# Patient Record
Sex: MALE | Race: WHITE | Employment: OTHER | URBAN - METROPOLITAN AREA
[De-identification: names, ages, dates, MRNs, and addresses within clinical notes are randomized per-mention and may not be internally consistent; named-entity substitution may affect disease eponyms.]

---

## 2018-10-19 PROBLEM — E53.8 B12 DEFICIENCY: Status: ACTIVE | Noted: 2018-10-19

## 2018-12-13 ENCOUNTER — HOSPITAL ENCOUNTER (OUTPATIENT)
Dept: MRI IMAGING | Age: 63
Discharge: HOME | End: 2018-12-13
Attending: OTOLARYNGOLOGY
Payer: MEDICARE

## 2018-12-13 DIAGNOSIS — R42 DIZZINESS: ICD-10-CM

## 2018-12-13 PROCEDURE — 70553 MRI BRAIN STEM W/O & W/DYE: CPT

## 2018-12-13 PROCEDURE — 74011250636 HC RX REV CODE- 250/636: Performed by: OTOLARYNGOLOGY

## 2018-12-13 PROCEDURE — A9575 INJ GADOTERATE MEGLUMI 0.1ML: HCPCS | Performed by: OTOLARYNGOLOGY

## 2018-12-13 RX ORDER — SODIUM CHLORIDE 0.9 % (FLUSH) 0.9 %
10 SYRINGE (ML) INJECTION
Status: COMPLETED | OUTPATIENT
Start: 2018-12-13 | End: 2018-12-13

## 2018-12-13 RX ORDER — GADOTERATE MEGLUMINE 376.9 MG/ML
20 INJECTION INTRAVENOUS
Status: COMPLETED | OUTPATIENT
Start: 2018-12-13 | End: 2018-12-13

## 2018-12-13 RX ADMIN — GADOTERATE MEGLUMINE 20 ML: 376.9 INJECTION INTRAVENOUS at 09:02

## 2018-12-13 RX ADMIN — Medication 10 ML: at 09:01

## 2018-12-20 PROBLEM — R06.02 SOB (SHORTNESS OF BREATH) ON EXERTION: Status: ACTIVE | Noted: 2018-12-20

## 2018-12-21 ENCOUNTER — APPOINTMENT (OUTPATIENT)
Dept: GENERAL RADIOLOGY | Age: 63
End: 2018-12-21
Attending: EMERGENCY MEDICINE
Payer: MEDICARE

## 2018-12-21 ENCOUNTER — APPOINTMENT (OUTPATIENT)
Dept: CT IMAGING | Age: 63
End: 2018-12-21
Attending: EMERGENCY MEDICINE
Payer: MEDICARE

## 2018-12-21 ENCOUNTER — HOSPITAL ENCOUNTER (OUTPATIENT)
Age: 63
Setting detail: OBSERVATION
Discharge: HOME | End: 2018-12-22
Attending: EMERGENCY MEDICINE | Admitting: FAMILY MEDICINE
Payer: MEDICARE

## 2018-12-21 DIAGNOSIS — I26.99 OTHER ACUTE PULMONARY EMBOLISM WITHOUT ACUTE COR PULMONALE (HCC): Primary | ICD-10-CM

## 2018-12-21 LAB
ALBUMIN SERPL-MCNC: 3.5 G/DL (ref 3.2–4.6)
ALBUMIN/GLOB SERPL: 1.1 {RATIO}
ALP SERPL-CCNC: 74 U/L (ref 50–136)
ALT SERPL-CCNC: 21 U/L (ref 12–65)
ANION GAP SERPL CALC-SCNC: 13 MMOL/L
APTT PPP: 68.6 SEC (ref 24.7–39.8)
AST SERPL-CCNC: 22 U/L (ref 15–37)
ATRIAL RATE: 112 BPM
BASOPHILS # BLD: 0 K/UL (ref 0–0.2)
BASOPHILS NFR BLD: 0 % (ref 0–2)
BILIRUB SERPL-MCNC: 0.8 MG/DL (ref 0.2–1.1)
BNP SERPL-MCNC: 53 PG/ML
BUN SERPL-MCNC: 19 MG/DL (ref 8–23)
CALCIUM SERPL-MCNC: 8.4 MG/DL (ref 8.3–10.4)
CALCULATED P AXIS, ECG09: 70 DEGREES
CALCULATED R AXIS, ECG10: 78 DEGREES
CALCULATED T AXIS, ECG11: 70 DEGREES
CHLORIDE SERPL-SCNC: 108 MMOL/L (ref 98–107)
CO2 SERPL-SCNC: 21 MMOL/L (ref 21–32)
CREAT SERPL-MCNC: 1.19 MG/DL (ref 0.8–1.5)
D DIMER PPP FEU-MCNC: 4.54 UG/ML(FEU)
DIAGNOSIS, 93000: NORMAL
DIFFERENTIAL METHOD BLD: ABNORMAL
EOSINOPHIL # BLD: 0 K/UL (ref 0–0.8)
EOSINOPHIL NFR BLD: 1 % (ref 0.5–7.8)
ERYTHROCYTE [DISTWIDTH] IN BLOOD BY AUTOMATED COUNT: 13.1 % (ref 11.9–14.6)
GLOBULIN SER CALC-MCNC: 3.3 G/DL (ref 2.3–3.5)
GLUCOSE SERPL-MCNC: 117 MG/DL (ref 65–100)
HCT VFR BLD AUTO: 43.1 % (ref 41.1–50.3)
HGB BLD-MCNC: 14.3 G/DL (ref 13.6–17.2)
IMM GRANULOCYTES # BLD: 0 K/UL (ref 0–0.5)
IMM GRANULOCYTES NFR BLD AUTO: 0 % (ref 0–5)
LYMPHOCYTES # BLD: 0.7 K/UL (ref 0.5–4.6)
LYMPHOCYTES NFR BLD: 8 % (ref 13–44)
MCH RBC QN AUTO: 35 PG (ref 26.1–32.9)
MCHC RBC AUTO-ENTMCNC: 33.2 G/DL (ref 31.4–35)
MCV RBC AUTO: 105.4 FL (ref 79.6–97.8)
MONOCYTES # BLD: 0.7 K/UL (ref 0.1–1.3)
MONOCYTES NFR BLD: 9 % (ref 4–12)
NEUTS SEG # BLD: 6.7 K/UL (ref 1.7–8.2)
NEUTS SEG NFR BLD: 82 % (ref 43–78)
NRBC # BLD: 0 K/UL (ref 0–0.2)
P-R INTERVAL, ECG05: 134 MS
PLATELET # BLD AUTO: 162 K/UL (ref 150–450)
PMV BLD AUTO: 10.2 FL (ref 9.4–12.3)
POTASSIUM SERPL-SCNC: 3.4 MMOL/L (ref 3.5–5.1)
PROT SERPL-MCNC: 6.8 G/DL
Q-T INTERVAL, ECG07: 342 MS
QRS DURATION, ECG06: 86 MS
QTC CALCULATION (BEZET), ECG08: 466 MS
RBC # BLD AUTO: 4.09 M/UL (ref 4.23–5.6)
SODIUM SERPL-SCNC: 142 MMOL/L (ref 136–145)
T4 FREE SERPL-MCNC: 1.1 NG/DL (ref 0.78–1.46)
TROPONIN I BLD-MCNC: 0.03 NG/ML (ref 0.02–0.05)
TROPONIN I BLD-MCNC: 0.04 NG/ML (ref 0.02–0.05)
TSH SERPL DL<=0.005 MIU/L-ACNC: 0.67 UIU/ML
VENTRICULAR RATE, ECG03: 112 BPM
WBC # BLD AUTO: 8.2 K/UL (ref 4.3–11.1)

## 2018-12-21 PROCEDURE — 99218 HC RM OBSERVATION: CPT

## 2018-12-21 PROCEDURE — 96375 TX/PRO/DX INJ NEW DRUG ADDON: CPT

## 2018-12-21 PROCEDURE — 36415 COLL VENOUS BLD VENIPUNCTURE: CPT

## 2018-12-21 PROCEDURE — 71045 X-RAY EXAM CHEST 1 VIEW: CPT

## 2018-12-21 PROCEDURE — 74011636320 HC RX REV CODE- 636/320: Performed by: EMERGENCY MEDICINE

## 2018-12-21 PROCEDURE — 74011000258 HC RX REV CODE- 258: Performed by: EMERGENCY MEDICINE

## 2018-12-21 PROCEDURE — 80053 COMPREHEN METABOLIC PANEL: CPT

## 2018-12-21 PROCEDURE — 99285 EMERGENCY DEPT VISIT HI MDM: CPT | Performed by: EMERGENCY MEDICINE

## 2018-12-21 PROCEDURE — 96376 TX/PRO/DX INJ SAME DRUG ADON: CPT

## 2018-12-21 PROCEDURE — 85730 THROMBOPLASTIN TIME PARTIAL: CPT

## 2018-12-21 PROCEDURE — 84484 ASSAY OF TROPONIN QUANT: CPT

## 2018-12-21 PROCEDURE — 85025 COMPLETE CBC W/AUTO DIFF WBC: CPT

## 2018-12-21 PROCEDURE — 85379 FIBRIN DEGRADATION QUANT: CPT

## 2018-12-21 PROCEDURE — 96374 THER/PROPH/DIAG INJ IV PUSH: CPT | Performed by: EMERGENCY MEDICINE

## 2018-12-21 PROCEDURE — 96366 THER/PROPH/DIAG IV INF ADDON: CPT

## 2018-12-21 PROCEDURE — 71260 CT THORAX DX C+: CPT

## 2018-12-21 PROCEDURE — 74011250636 HC RX REV CODE- 250/636: Performed by: EMERGENCY MEDICINE

## 2018-12-21 PROCEDURE — 74011250636 HC RX REV CODE- 250/636: Performed by: FAMILY MEDICINE

## 2018-12-21 PROCEDURE — 93005 ELECTROCARDIOGRAM TRACING: CPT | Performed by: EMERGENCY MEDICINE

## 2018-12-21 PROCEDURE — 84443 ASSAY THYROID STIM HORMONE: CPT

## 2018-12-21 PROCEDURE — 84439 ASSAY OF FREE THYROXINE: CPT

## 2018-12-21 PROCEDURE — 96365 THER/PROPH/DIAG IV INF INIT: CPT | Performed by: EMERGENCY MEDICINE

## 2018-12-21 PROCEDURE — 83880 ASSAY OF NATRIURETIC PEPTIDE: CPT

## 2018-12-21 RX ORDER — HYDROCODONE BITARTRATE AND ACETAMINOPHEN 5; 325 MG/1; MG/1
1 TABLET ORAL
Status: DISCONTINUED | OUTPATIENT
Start: 2018-12-21 | End: 2018-12-22 | Stop reason: HOSPADM

## 2018-12-21 RX ORDER — SODIUM CHLORIDE 9 MG/ML
1000 INJECTION, SOLUTION INTRAVENOUS ONCE
Status: COMPLETED | OUTPATIENT
Start: 2018-12-21 | End: 2018-12-21

## 2018-12-21 RX ORDER — PREDNISONE 5 MG/1
5 TABLET ORAL
Status: DISCONTINUED | OUTPATIENT
Start: 2018-12-22 | End: 2018-12-22 | Stop reason: HOSPADM

## 2018-12-21 RX ORDER — LISINOPRIL 20 MG/1
40 TABLET ORAL DAILY
Status: DISCONTINUED | OUTPATIENT
Start: 2018-12-22 | End: 2018-12-22 | Stop reason: HOSPADM

## 2018-12-21 RX ORDER — LORATADINE 10 MG/1
10 TABLET ORAL DAILY
Status: DISCONTINUED | OUTPATIENT
Start: 2018-12-22 | End: 2018-12-22 | Stop reason: HOSPADM

## 2018-12-21 RX ORDER — FLUTICASONE PROPIONATE 50 MCG
2 SPRAY, SUSPENSION (ML) NASAL DAILY
Status: DISCONTINUED | OUTPATIENT
Start: 2018-12-22 | End: 2018-12-22 | Stop reason: HOSPADM

## 2018-12-21 RX ORDER — SODIUM CHLORIDE 0.9 % (FLUSH) 0.9 %
5-10 SYRINGE (ML) INJECTION AS NEEDED
Status: DISCONTINUED | OUTPATIENT
Start: 2018-12-21 | End: 2018-12-22 | Stop reason: HOSPADM

## 2018-12-21 RX ORDER — LEVOTHYROXINE SODIUM 100 UG/1
200 TABLET ORAL
Status: DISCONTINUED | OUTPATIENT
Start: 2018-12-22 | End: 2018-12-22 | Stop reason: HOSPADM

## 2018-12-21 RX ORDER — SODIUM CHLORIDE 0.9 % (FLUSH) 0.9 %
5-10 SYRINGE (ML) INJECTION EVERY 8 HOURS
Status: DISCONTINUED | OUTPATIENT
Start: 2018-12-21 | End: 2018-12-22 | Stop reason: HOSPADM

## 2018-12-21 RX ORDER — ACETAMINOPHEN 325 MG/1
650 TABLET ORAL
Status: DISCONTINUED | OUTPATIENT
Start: 2018-12-21 | End: 2018-12-22 | Stop reason: HOSPADM

## 2018-12-21 RX ORDER — MORPHINE SULFATE 2 MG/ML
1 INJECTION, SOLUTION INTRAMUSCULAR; INTRAVENOUS
Status: DISCONTINUED | OUTPATIENT
Start: 2018-12-21 | End: 2018-12-22 | Stop reason: HOSPADM

## 2018-12-21 RX ORDER — SODIUM CHLORIDE 0.9 % (FLUSH) 0.9 %
10 SYRINGE (ML) INJECTION
Status: COMPLETED | OUTPATIENT
Start: 2018-12-21 | End: 2018-12-21

## 2018-12-21 RX ORDER — HEPARIN SODIUM 5000 [USP'U]/ML
5000 INJECTION, SOLUTION INTRAVENOUS; SUBCUTANEOUS
Status: COMPLETED | OUTPATIENT
Start: 2018-12-21 | End: 2018-12-21

## 2018-12-21 RX ORDER — HEPARIN SODIUM 5000 [USP'U]/ML
35 INJECTION, SOLUTION INTRAVENOUS; SUBCUTANEOUS ONCE
Status: COMPLETED | OUTPATIENT
Start: 2018-12-22 | End: 2018-12-21

## 2018-12-21 RX ORDER — HEPARIN SODIUM 5000 [USP'U]/100ML
18-36 INJECTION, SOLUTION INTRAVENOUS
Status: DISCONTINUED | OUTPATIENT
Start: 2018-12-21 | End: 2018-12-22

## 2018-12-21 RX ORDER — ONDANSETRON 2 MG/ML
4 INJECTION INTRAMUSCULAR; INTRAVENOUS
Status: DISCONTINUED | OUTPATIENT
Start: 2018-12-21 | End: 2018-12-22 | Stop reason: HOSPADM

## 2018-12-21 RX ADMIN — Medication 10 ML: at 15:42

## 2018-12-21 RX ADMIN — HEPARIN SODIUM 3150 UNITS: 5000 INJECTION INTRAVENOUS; SUBCUTANEOUS at 23:44

## 2018-12-21 RX ADMIN — Medication 5 ML: at 21:40

## 2018-12-21 RX ADMIN — HEPARIN SODIUM AND DEXTROSE 18 UNITS/KG/HR: 5000; 5 INJECTION INTRAVENOUS at 17:06

## 2018-12-21 RX ADMIN — HEPARIN SODIUM 5000 UNITS: 5000 INJECTION INTRAVENOUS; SUBCUTANEOUS at 17:04

## 2018-12-21 RX ADMIN — SODIUM CHLORIDE 100 ML: 900 INJECTION, SOLUTION INTRAVENOUS at 15:42

## 2018-12-21 RX ADMIN — IOPAMIDOL 100 ML: 755 INJECTION, SOLUTION INTRAVENOUS at 15:42

## 2018-12-21 RX ADMIN — SODIUM CHLORIDE 1000 ML: 900 INJECTION, SOLUTION INTRAVENOUS at 13:30

## 2018-12-21 RX ADMIN — HEPARIN SODIUM AND DEXTROSE 18 UNITS/KG/HR: 5000; 5 INJECTION INTRAVENOUS at 18:04

## 2018-12-21 RX ADMIN — HEPARIN SODIUM AND DEXTROSE 20 UNITS/KG/HR: 5000; 5 INJECTION INTRAVENOUS at 23:45

## 2018-12-21 NOTE — H&P
HOSPITALIST H&P/CONSULT  NAME:  Lorenzo Lima   Age:  70 y.o.  :   1955   MRN:   [id number]  PCP: Sly Haque MD  Consulting MD:  Treatment Team: Attending Provider: Moreno Gilmore MD  HPI:   Patient is a 57TYR with PMH significant for HTN, hypothyroidism, GERD who presents to the ER with progressive SOB and chest pain. Patient reports that starting last Saturday, he began feeling SOB. He reports not feeling \"that bad\" but did make an appt with his PCP on Thursday. He saw his doctor yesterday who told him that it could be his heart or his lungs, but his VS were stable. She told him to go to the ER if his symptoms progressed or if he developed chest pain. This morning, he was watching TV and had the onset of severe mid-chest pain. Described as feeling like someone was \"grabbing his heart. \"  He came to the ER for further evaluation. D-dimer was elevated and CTA shows diffuse and substantial B PEs. Pt was started on heparin drip, and Hospitalist Service was consulted for admission. Patient denies any recent travel via car, plane, bus, train. He states he is retired, so he is pretty sedentary, but he is not bed-bound or immobile for long periods of time. He denies any personal or family history of coagulopathy. He does not smoke. He does report that he sleeps in his recliner because of his GERD. He only complains of SOB, chest pain, and intermittent headache. Not currently hypoxic. Denies any calf pain or swelling. Heparin bolus and infusion already initiated in ER by time of my bedside evaluation.     Complete ROS done and is as stated in HPI or otherwise negative  Past Medical History:   Diagnosis Date    Arthritis     Change in vision     Chronic hip pain 10/20/2016    Edema leg     Endocrine disease     hypothyroidism    GERD (gastroesophageal reflux disease)     well controlled on meds    High cholesterol     Hypertension     Iron deficiency anemia     Murmur  Muscle weakness     Myalgia     Neurological disorder     HA    Pelvic pain in male     Polymyalgia rheumatica ([facility] [address] 10/20/2016    Psychiatric disorder     depression    SDH (subdural hematoma) (HCC)     HA, memory loss    Thyroid disease     hypo. Past Surgical History:   Procedure Laterality Date    HX TONSILLECTOMY        Prior to Admission Medications   Prescriptions Last Dose Informant Patient Reported? Taking?   fexofenadine (ALLEGRA) 180 mg tablet   No Yes   Sig: Take 1 Tab by mouth daily. fluticasone (FLONASE) 50 mcg/actuation nasal spray   No Yes   Si Sprays by Both Nostrils route daily. levothyroxine (SYNTHROID) 200 mcg tablet   No Yes   Sig: Take 1 Tab by mouth Daily (before breakfast). lisinopril (PRINIVIL, ZESTRIL) 40 mg tablet   No Yes   Sig: Take 1 Tab by mouth daily. predniSONE (DELTASONE) 5 mg tablet   No Yes   Sig: Take 1 Tab by mouth daily (with breakfast). Facility-Administered Medications: None     Allergies   Allergen Reactions    Pcn [Penicillins] Unknown (comments)    Amoxicillin Hives      Social History     Tobacco Use    Smoking status: Former Smoker     Years: 20.00     Last attempt to quit: 2006     Years since quittin.8    Smokeless tobacco: Former User    Tobacco comment: Chew, quit 15 years ago. Substance Use Topics    Alcohol use:  Yes     Alcohol/week: 7.0 oz     Types: 14 Shots of liquor per week      Family History   Problem Relation Age of Onset    Cancer Mother     Heart Disease Father     Hypertension Father       Objective:     Visit Vitals  BP (!) 163/100   Pulse 91   Temp 97.5 °F (36.4 °C)   Resp 17   Ht 6' 5\" (1.956 m)   Wt 89.4 kg (197 lb)   SpO2 98%   BMI 23.36 kg/m²      Temp (24hrs), Av.5 °F (36.4 °C), Min:97.5 °F (36.4 °C), Max:97.5 °F (36.4 °C)    Oxygen Therapy  O2 Sat (%): 98 % (18 1730)  Pulse via Oximetry: 85 beats per minute (18 1730)  O2 Device: Room air (18 1354)  Physical Exam:  General:    Alert, cooperative, no distress, appears stated age. Head:   Normocephalic, without obvious abnormality, atraumatic. Nose:  Nares normal. No drainage or sinus tenderness. Lungs:   Diminished, but clear to auscultation bilaterally. No Wheezing or Rhonchi. No rales. Heart:   Regular rate and rhythm,  no murmur, rub or gallop. Abdomen:   Soft, non-tender. Not distended. Bowel sounds normal.   Extremities: No cyanosis. No edema. No clubbing  Skin:     Texture, turgor normal. No rashes or lesions. Not Jaundiced  Neurologic: Alert and oriented x 3, no focal deficits   Data Review:   Recent Results (from the past 24 hour(s))   EKG, 12 LEAD, INITIAL    Collection Time: 12/21/18 12:37 PM   Result Value Ref Range    Ventricular Rate 112 BPM    Atrial Rate 112 BPM    P-R Interval 134 ms    QRS Duration 86 ms    Q-T Interval 342 ms    QTC Calculation (Bezet) 466 ms    Calculated P Axis 70 degrees    Calculated R Axis 78 degrees    Calculated T Axis 70 degrees    Diagnosis       Sinus tachycardia  Possible Left atrial enlargement  Nonspecific ST abnormality  Abnormal ECG  No previous ECGs available  Confirmed by TIFFANIE HECTOR (), Ivette Vargas ([id number]) on 12/21/2018 4:17:23 PM     CBC WITH AUTOMATED DIFF    Collection Time: 12/21/18 12:53 PM   Result Value Ref Range    WBC 8.2 4.3 - 11.1 K/uL    RBC 4.09 (L) 4.23 - 5.6 M/uL    HGB 14.3 13.6 - 17.2 g/dL    HCT 43.1 41.1 - 50.3 %    .4 (H) 79.6 - 97.8 FL    MCH 35.0 (H) 26.1 - 32.9 PG    MCHC 33.2 31.4 - 35.0 g/dL    RDW 13.1 11.9 - 14.6 %    PLATELET 582 854 - 595 K/uL    MPV 10.2 9.4 - 12.3 FL    ABSOLUTE NRBC 0.00 0.0 - 0.2 K/uL    DF AUTOMATED      NEUTROPHILS 82 (H) 43 - 78 %    LYMPHOCYTES 8 (L) 13 - 44 %    MONOCYTES 9 4.0 - 12.0 %    EOSINOPHILS 1 0.5 - 7.8 %    BASOPHILS 0 0.0 - 2.0 %    IMMATURE GRANULOCYTES 0 0.0 - 5.0 %    ABS. NEUTROPHILS 6.7 1.7 - 8.2 K/UL    ABS. LYMPHOCYTES 0.7 0.5 - 4.6 K/UL    ABS. MONOCYTES 0.7 0.1 - 1.3 K/UL    ABS. EOSINOPHILS 0.0 0.0 - 0.8 K/UL    ABS. BASOPHILS 0.0 0.0 - 0.2 K/UL    ABS. IMM. GRANS. 0.0 0.0 - 0.5 K/UL   METABOLIC PANEL, COMPREHENSIVE    Collection Time: 12/21/18 12:53 PM   Result Value Ref Range    Sodium 142 136 - 145 mmol/L    Potassium 3.4 (L) 3.5 - 5.1 mmol/L    Chloride 108 (H) 98 - 107 mmol/L    CO2 21 21 - 32 mmol/L    Anion gap 13 mmol/L    Glucose 117 (H) 65 - 100 mg/dL    BUN 19 8 - 23 MG/DL    Creatinine 1.19 0.8 - 1.5 MG/DL    GFR est AA >60 >60 ml/min/1.73m2    GFR est non-AA >60 ml/min/1.73m2    Calcium 8.4 8.3 - 10.4 MG/DL    Bilirubin, total 0.8 0.2 - 1.1 MG/DL    ALT (SGPT) 21 12 - 65 U/L    AST (SGOT) 22 15 - 37 U/L    Alk. phosphatase 74 50 - 136 U/L    Protein, total 6.8 g/dL    Albumin 3.5 3.2 - 4.6 g/dL    Globulin 3.3 2.3 - 3.5 g/dL    A-G Ratio 1.1     TSH 3RD GENERATION    Collection Time: 12/21/18 12:53 PM   Result Value Ref Range    TSH 0.667 uIU/mL   T4, FREE    Collection Time: 12/21/18 12:53 PM   Result Value Ref Range    T4, Free 1.1 0.78 - 1.46 NG/DL   D DIMER    Collection Time: 12/21/18 12:53 PM   Result Value Ref Range    D DIMER 4.54 (HH) <0.56 ug/ml(FEU)   BNP    Collection Time: 12/21/18 12:53 PM   Result Value Ref Range    BNP 53 pg/mL   POC TROPONIN-I    Collection Time: 12/21/18 12:56 PM   Result Value Ref Range    Troponin-I (POC) 0.04 0.02 - 0.05 ng/ml   POC TROPONIN-I    Collection Time: 12/21/18  4:12 PM   Result Value Ref Range    Troponin-I (POC) 0.03 0.02 - 0.05 ng/ml     Imaging /Procedures /Studies     Assessment and Plan:      Active Hospital Problems    Diagnosis Date Noted    Pulmonary emboli ([facility] [address] 12/21/2018    Hypertension 10/20/2016    Hypothyroidism 02/10/2011       PLAN  Extensive Bilateral PEs  - No obvious inciting factor  - Not currently hypoxic  - Will continue heparin drip as started in ER  - Consult with Hematology for their recommendation  - Will check hypercoagulability labs, except lupus anticoagulant and antithrombin as these can be affected by heparin, which has already been initiated  - Pain control  - O2 if needed    HTN  - Restart Lisinopril    Hypothyroidism  - Restart Synthroid       Code Status: full    Anticipated discharge: 1-3 days, pending clinical course      Signed By: Yazmin España MD     December 21, 2018

## 2018-12-21 NOTE — ED NOTES
TRANSFER - OUT REPORT:    Verbal report given to Jasbir Mills (name) on Lorre Heimlich  being transferred to 351 (unit) for routine progression of care       Report consisted of patients Situation, Background, Assessment and   Recommendations(SBAR). Information from the following report(s) ED Summary was reviewed with the receiving nurse. Lines:   Peripheral IV 12/21/18 Left Antecubital (Active)   Site Assessment Clean, dry, & intact 12/21/2018  1:53 PM   Phlebitis Assessment 0 12/21/2018  1:53 PM   Infiltration Assessment 0 12/21/2018  1:53 PM   Dressing Status Clean, dry, & intact 12/21/2018  1:53 PM   Alcohol Cap Used No 12/21/2018  1:53 PM        Opportunity for questions and clarification was provided.       Patient transported with:   Registered Nurse

## 2018-12-21 NOTE — ED TRIAGE NOTES
Pt in states sob x 1 week. States seen by pcp yesterday and told to come to ed if he began having chest pain. Pt states cp started today while watching tv. Denies n/v/d/diaphoresis. States sob worse with exertion. Denies cough.

## 2018-12-22 ENCOUNTER — APPOINTMENT (OUTPATIENT)
Dept: ULTRASOUND IMAGING | Age: 63
End: 2018-12-22
Attending: FAMILY MEDICINE
Payer: MEDICARE

## 2018-12-22 VITALS
TEMPERATURE: 98.1 F | WEIGHT: 197 LBS | OXYGEN SATURATION: 97 % | HEART RATE: 89 BPM | DIASTOLIC BLOOD PRESSURE: 106 MMHG | HEIGHT: 77 IN | SYSTOLIC BLOOD PRESSURE: 170 MMHG | BODY MASS INDEX: 23.26 KG/M2 | RESPIRATION RATE: 19 BRPM

## 2018-12-22 LAB
ANION GAP SERPL CALC-SCNC: 11 MMOL/L
APTT PPP: 105.2 SEC (ref 24.7–39.8)
APTT PPP: 61.4 SEC (ref 24.7–39.8)
BASOPHILS # BLD: 0 K/UL (ref 0–0.2)
BASOPHILS NFR BLD: 1 % (ref 0–2)
BUN SERPL-MCNC: 13 MG/DL (ref 8–23)
CALCIUM SERPL-MCNC: 8.3 MG/DL (ref 8.3–10.4)
CHLORIDE SERPL-SCNC: 109 MMOL/L (ref 98–107)
CO2 SERPL-SCNC: 24 MMOL/L (ref 21–32)
CREAT SERPL-MCNC: 0.92 MG/DL (ref 0.8–1.5)
DIFFERENTIAL METHOD BLD: ABNORMAL
EOSINOPHIL # BLD: 0.1 K/UL (ref 0–0.8)
EOSINOPHIL NFR BLD: 2 % (ref 0.5–7.8)
ERYTHROCYTE [DISTWIDTH] IN BLOOD BY AUTOMATED COUNT: 12.9 % (ref 11.9–14.6)
GLUCOSE SERPL-MCNC: 112 MG/DL (ref 65–100)
HCT VFR BLD AUTO: 37.3 % (ref 41.1–50.3)
HGB BLD-MCNC: 12.5 G/DL (ref 13.6–17.2)
IMM GRANULOCYTES # BLD: 0 K/UL (ref 0–0.5)
IMM GRANULOCYTES NFR BLD AUTO: 1 % (ref 0–5)
LYMPHOCYTES # BLD: 0.8 K/UL (ref 0.5–4.6)
LYMPHOCYTES NFR BLD: 14 % (ref 13–44)
MCH RBC QN AUTO: 35.2 PG (ref 26.1–32.9)
MCHC RBC AUTO-ENTMCNC: 33.5 G/DL (ref 31.4–35)
MCV RBC AUTO: 105.1 FL (ref 79.6–97.8)
MONOCYTES # BLD: 0.6 K/UL (ref 0.1–1.3)
MONOCYTES NFR BLD: 11 % (ref 4–12)
NEUTS SEG # BLD: 4.1 K/UL (ref 1.7–8.2)
NEUTS SEG NFR BLD: 73 % (ref 43–78)
NRBC # BLD: 0 K/UL (ref 0–0.2)
PLATELET # BLD AUTO: 149 K/UL (ref 150–450)
PMV BLD AUTO: 10.6 FL (ref 9.4–12.3)
POTASSIUM SERPL-SCNC: 3.6 MMOL/L (ref 3.5–5.1)
RBC # BLD AUTO: 3.55 M/UL (ref 4.23–5.6)
SODIUM SERPL-SCNC: 144 MMOL/L (ref 136–145)
WBC # BLD AUTO: 5.6 K/UL (ref 4.3–11.1)

## 2018-12-22 PROCEDURE — A9270 NON-COVERED ITEM OR SERVICE: HCPCS | Performed by: FAMILY MEDICINE

## 2018-12-22 PROCEDURE — 80048 BASIC METABOLIC PNL TOTAL CA: CPT

## 2018-12-22 PROCEDURE — 85306 CLOT INHIBIT PROT S FREE: CPT

## 2018-12-22 PROCEDURE — 96366 THER/PROPH/DIAG IV INF ADDON: CPT

## 2018-12-22 PROCEDURE — 85240 CLOT FACTOR VIII AHG 1 STAGE: CPT

## 2018-12-22 PROCEDURE — 74011636637 HC RX REV CODE- 636/637: Performed by: FAMILY MEDICINE

## 2018-12-22 PROCEDURE — 74011250637 HC RX REV CODE- 250/637: Performed by: FAMILY MEDICINE

## 2018-12-22 PROCEDURE — 85730 THROMBOPLASTIN TIME PARTIAL: CPT

## 2018-12-22 PROCEDURE — 99218 HC RM OBSERVATION: CPT

## 2018-12-22 PROCEDURE — 85025 COMPLETE CBC W/AUTO DIFF WBC: CPT

## 2018-12-22 PROCEDURE — 93970 EXTREMITY STUDY: CPT

## 2018-12-22 PROCEDURE — 85302 CLOT INHIBIT PROT C ANTIGEN: CPT

## 2018-12-22 PROCEDURE — 36415 COLL VENOUS BLD VENIPUNCTURE: CPT

## 2018-12-22 PROCEDURE — 74011250636 HC RX REV CODE- 250/636: Performed by: EMERGENCY MEDICINE

## 2018-12-22 RX ADMIN — LISINOPRIL 40 MG: 20 TABLET ORAL at 08:46

## 2018-12-22 RX ADMIN — PREDNISONE 5 MG: 5 TABLET ORAL at 08:46

## 2018-12-22 RX ADMIN — Medication 5 ML: at 05:37

## 2018-12-22 RX ADMIN — LEVOTHYROXINE SODIUM 200 MCG: 100 TABLET ORAL at 08:47

## 2018-12-22 RX ADMIN — Medication 10 ML: at 13:55

## 2018-12-22 RX ADMIN — LORATADINE 10 MG: 10 TABLET ORAL at 08:46

## 2018-12-22 RX ADMIN — HEPARIN SODIUM AND DEXTROSE 20 UNITS/KG/HR: 5000; 5 INJECTION INTRAVENOUS at 07:10

## 2018-12-22 NOTE — PROGRESS NOTES
Admission assessment complete. A&O x 4. No complaints at this time. Oriented to room and call light. No distress noted. Respirations even and unlabored. IV heparin drip infusing without difficulty. No complaints at this time. Call light within reach.

## 2018-12-22 NOTE — PROGRESS NOTES
Notified MD of PTt results. MD stated that patient is being discharged and not necessary to do a rate change.

## 2018-12-22 NOTE — PROGRESS NOTES
SW met with patient who is independent at home and presenting with no additional needs. Patient anticipated to discharge with Xarelto. SW provided patient with a $10 copay card.      Morris Iniguez, [address] [facility]    [address] [facility]  [email or web address]

## 2018-12-22 NOTE — DISCHARGE INSTRUCTIONS
DISCHARGE SUMMARY from Nurse    PATIENT INSTRUCTIONS:    After general anesthesia or intravenous sedation, for 24 hours or while taking prescription Narcotics:  · Limit your activities  · Do not drive and operate hazardous machinery  · Do not make important personal or business decisions  · Do  not drink alcoholic beverages  · If you have not urinated within 8 hours after discharge, please contact your surgeon on call. Report the following to your surgeon:  · Excessive pain, swelling, redness or odor of or around the surgical area  · Temperature over 100.5  · Nausea and vomiting lasting longer than 4 hours or if unable to take medications  · Any signs of decreased circulation or nerve impairment to extremity: change in color, persistent  numbness, tingling, coldness or increase pain  · Any questions    What to do at Home:    Recommended activity: Activity as tolerated, DO NOT SLEEP IN A RECLINER OR CHAIR, move around every hour    If you experience any of the above stated signs or symptoms, please follow up with MD.    *  Please give a list of your current medications to your Primary Care Provider. *  Please update this list whenever your medications are discontinued, doses are      changed, or new medications (including over-the-counter products) are added. *  Please carry medication information at all times in case of emergency situations. These are general instructions for a healthy lifestyle:    No smoking/ No tobacco products/ Avoid exposure to second hand smoke  Surgeon General's Warning:  Quitting smoking now greatly reduces serious risk to your health.     Obesity, smoking, and sedentary lifestyle greatly increases your risk for illness    A healthy diet, regular physical exercise & weight monitoring are important for maintaining a healthy lifestyle    You may be retaining fluid if you have a history of heart failure or if you experience any of the following symptoms:  Weight gain of 3 pounds or more overnight or 5 pounds in a week, increased swelling in our hands or feet or shortness of breath while lying flat in bed. Please call your doctor as soon as you notice any of these symptoms; do not wait until your next office visit. Recognize signs and symptoms of STROKE:    F-face looks uneven    A-arms unable to move or move unevenly    S-speech slurred or non-existent    T-time-call 911 as soon as signs and symptoms begin-DO NOT go       Back to bed or wait to see if you get better-TIME IS BRAIN. Warning Signs of HEART ATTACK     Call 911 if you have these symptoms:   Chest discomfort. Most heart attacks involve discomfort in the center of the chest that lasts more than a few minutes, or that goes away and comes back. It can feel like uncomfortable pressure, squeezing, fullness, or pain.  Discomfort in other areas of the upper body. Symptoms can include pain or discomfort in one or both arms, the back, neck, jaw, or stomach.  Shortness of breath with or without chest discomfort.  Other signs may include breaking out in a cold sweat, nausea, or lightheadedness. Don't wait more than five minutes to call 911 - MINUTES MATTER! Fast action can save your life. Calling 911 is almost always the fastest way to get lifesaving treatment. Emergency Medical Services staff can begin treatment when they arrive -- up to an hour sooner than if someone gets to the hospital by car. The discharge information has been reviewed with the patient. The patient verbalized understanding. Discharge medications reviewed with the patient and appropriate educational materials and side effects teaching were provided.   ___________________________________________________________________________________________________________________________________

## 2018-12-22 NOTE — PROGRESS NOTES
Pt admitted to [facility] 3rd floor. Admission database completed. Admission booklet given and reviewed with patient. Pt oriented to room and bed controls. Pt instructed to use call light for any needs, pt voiced understanding.

## 2018-12-22 NOTE — PROGRESS NOTES
12/21/18 1940   Dual Skin Pressure Injury Assessment   Dual Skin Pressure Injury Assessment WDL   Second Care Provider (Based on Facility Policy) Praneeth Pandya RN   Skin Integumentary   Skin Integumentary (WDL) WDL

## 2018-12-23 NOTE — DISCHARGE SUMMARY
Hospitalist Discharge Summary     Patient ID:  Jeremy Rivera  [id number]  70 y.o.  1955  Admit date: 12/21/2018 12:35 PM  Discharge date and time: 12/22/2018  Attending: Maurisio Camacho MD  PCP:  Estrellita Ireland MD  Treatment Team: Attending Provider: Maurisio Camacho MD; Care Manager: Otto Gascabody    Principal Diagnosis Pulmonary emboli [facility])   Principal Problem:    Pulmonary emboli ([address] (12/21/2018)    Active Problems:    Hypothyroidism (2/10/2011)      Hypertension (10/20/2016)             Hospital Course:  Please refer to the admission H&P for details of presentation. In summary, the patient is a 76yoM with h/o HTN and hypothyroidism who presented to the ER with chest pain and progress SOB. Patient was diagnosed with extensive B pulmonary emboli. He did not require supplemental oxygen. Chest pain resolved. He was started on heparin drip in the ER which was continued through his stay. Send out hypercoagulability labs were sent off, and PCP will follow up with these on discharge. Patient feels well and wishes to go home. I discussed risks and benefits of coumadin vs NOAC, including but not limited to, increased risk of bleeding, availability of reversal agents, monitoring, length of treatment. Patient opts for a NOAC and Xarelto has been prescribed. While pt did not have symptoms of LE clot, he was found to have LE DVT found on doppler. It is believed that as the patient sleeps in his recliner and reports some \"pinching\" of his leg, he may have developed a clot 2/2 positioning in sleep. Concerning symptoms including CP and worsening SOB and evidence of bleeding were discussed and patient voiced understanding that he needs to go to the nearest ER for reevaluation if these occure.     Significant Diagnostic Studies:       Labs: Results:       Chemistry Recent Labs     12/22/18  0617 12/21/18  1253   * 117*    142   K 3.6 3.4*   * 108*   CO2 24 21   BUN 13 19   CREA 0. 92 1.19   CA 8.3 8.4   AGAP 11 13   AP  --  74   TP  --  6.8   ALB  --  3.5   GLOB  --  3.3   AGRAT  --  1.1      CBC w/Diff Recent Labs     12/22/18  0617 12/21/18  1253   WBC 5.6 8.2   RBC 3.55* 4.09*   HGB 12.5* 14.3   HCT 37.3* 43.1   * 162   GRANS 73 82*   LYMPH 14 8*   EOS 2 1      Cardiac Enzymes No results for input(s): CPK, CKND1, MARIO in the last 72 hours. No lab exists for component: CKRMB, TROIP   Coagulation Recent Labs     12/22/18  1320 12/22/18 0617   APTT 61.4* 105.2*       Lipid Panel Lab Results   Component Value Date/Time    Cholesterol, total 183 10/18/2017 09:01 AM    HDL Cholesterol 3.21 10/18/2017 09:01 AM    LDL, calculated 100.4 10/18/2017 09:01 AM    VLDL 25 10/18/2017 09:01 AM    Triglyceride 128 10/18/2017 09:01 AM      BNP No results for input(s): BNPP in the last 72 hours. Liver Enzymes Recent Labs     12/21/18  1253   TP 6.8   ALB 3.5   AP 74   SGOT 22      Thyroid Studies Lab Results   Component Value Date/Time    TSH 0.667 12/21/2018 12:53 PM            Discharge Exam:  Visit Vitals  BP (!) 170/106 (BP 1 Location: Right arm, BP Patient Position: At rest) Comment: RN notified    Pulse 89   Temp 98.1 °F (36.7 °C)   Resp 19   Ht 6' 5\" (1.956 m)   Wt 89.4 kg (197 lb)   SpO2 97%   BMI 23.36 kg/m²     General appearance: alert, cooperative, no distress, appears stated age  Lungs: clear to auscultation bilaterally  Heart: regular rate and rhythm, S1, S2 normal, no murmur, click, rub or gallop  Abdomen: soft, non-tender. Bowel sounds normal. No masses,  no organomegaly  Extremities: no cyanosis or edema  Neurologic: Grossly normal    Disposition: home  Discharge Condition: stable  Patient Instructions:   Current Discharge Medication List      START taking these medications    Details   rivaroxaban (XARELTO) 15 mg (42)- 20 mg (9) DsPk Take one 15 mg tablet twice a day with food for the first 21 days. Then, take one 20 mg tablet once a day with food for 9 days.   Qty: 1 Dose Pack, Refills: 0         CONTINUE these medications which have NOT CHANGED    Details   levothyroxine (SYNTHROID) 200 mcg tablet Take 1 Tab by mouth Daily (before breakfast). Qty: 90 Tab, Refills: 1    Associated Diagnoses: Acquired hypothyroidism      predniSONE (DELTASONE) 5 mg tablet Take 1 Tab by mouth daily (with breakfast). Qty: 30 Tab, Refills: 3    Associated Diagnoses: Polymyalgia rheumatica (HCC)      lisinopril (PRINIVIL, ZESTRIL) 40 mg tablet Take 1 Tab by mouth daily. Qty: 90 Tab, Refills: 1    Associated Diagnoses: Essential hypertension      fexofenadine (ALLEGRA) 180 mg tablet Take 1 Tab by mouth daily. Qty: 30 Tab, Refills: 12    Associated Diagnoses: ETD (Eustachian tube dysfunction), bilateral      fluticasone (FLONASE) 50 mcg/actuation nasal spray 2 Sprays by Both Nostrils route daily.   Qty: 1 Bottle, Refills: 12    Associated Diagnoses: ETD (Eustachian tube dysfunction), bilateral             Activity: Activity as tolerated  Diet: Regular Diet  Wound Care: None needed    Follow-up  · PCP next week as scheduled    Time spent to discharge patient 35 minutes  Signed:  Dejuan Kelly MD  12/22/2018  7:13 PM

## 2019-01-01 LAB
FACT VIII ACT/NOR PPP: NORMAL %
FACTOR V LEIDEN MUTATION, XMF5LT: NORMAL %
PROT C AG ACT/NOR PPP IA: NORMAL %
PROT S FREE AG ACT/NOR PPP IA: NORMAL %
PROTHROMBIN G2021A MUTATION, XMPTMT: NORMAL

## 2019-02-15 ENCOUNTER — HOSPITAL ENCOUNTER (OUTPATIENT)
Dept: GENERAL RADIOLOGY | Age: 64
Discharge: HOME | End: 2019-02-15
Payer: MEDICARE

## 2019-02-15 DIAGNOSIS — R06.02 SOB (SHORTNESS OF BREATH) ON EXERTION: ICD-10-CM

## 2019-02-15 PROCEDURE — 71046 X-RAY EXAM CHEST 2 VIEWS: CPT

## 2019-02-25 ENCOUNTER — APPOINTMENT (OUTPATIENT)
Dept: GENERAL RADIOLOGY | Age: 64
End: 2019-02-25
Attending: EMERGENCY MEDICINE
Payer: MEDICARE

## 2019-02-25 ENCOUNTER — HOSPITAL ENCOUNTER (EMERGENCY)
Age: 64
Discharge: HOME | End: 2019-02-25
Attending: EMERGENCY MEDICINE
Payer: MEDICARE

## 2019-02-25 VITALS
HEART RATE: 105 BPM | BODY MASS INDEX: 23.14 KG/M2 | OXYGEN SATURATION: 98 % | RESPIRATION RATE: 16 BRPM | WEIGHT: 196 LBS | HEIGHT: 77 IN | SYSTOLIC BLOOD PRESSURE: 124 MMHG | DIASTOLIC BLOOD PRESSURE: 84 MMHG | TEMPERATURE: 98.7 F

## 2019-02-25 DIAGNOSIS — S22.31XA CLOSED FRACTURE OF ONE RIB OF RIGHT SIDE, INITIAL ENCOUNTER: Primary | ICD-10-CM

## 2019-02-25 PROCEDURE — 71100 X-RAY EXAM RIBS UNI 2 VIEWS: CPT

## 2019-02-25 PROCEDURE — 99282 EMERGENCY DEPT VISIT SF MDM: CPT | Performed by: EMERGENCY MEDICINE

## 2019-02-25 RX ORDER — HYDROCODONE BITARTRATE AND ACETAMINOPHEN 5; 325 MG/1; MG/1
1 TABLET ORAL
Qty: 15 TAB | Refills: 0 | Status: SHIPPED | OUTPATIENT
Start: 2019-02-25 | End: 2019-03-21

## 2019-02-25 NOTE — DISCHARGE INSTRUCTIONS
Patient Education        Broken Rib: Care Instructions  Your Care Instructions    A broken rib is a crack or break in one of the bones of the rib cage. Breathing can be very painful because the muscles used for breathing pull on the rib. In most cases, a broken rib will heal on its own. You can take pain medicine while the rib mends. Pain relief allows you to take deep breaths. In the past, doctors recommended taping or wrapping broken ribs. This is no longer done because taping makes it hard for you to take deep breaths. Taking deep breaths may help prevent pneumonia or a partial collapse of a lung. Your rib will heal in about 6 weeks. You heal best when you take good care of yourself. Eat a variety of healthy foods, and don't smoke. Follow-up care is a key part of your treatment and safety. Be sure to make and go to all appointments, and call your doctor if you are having problems. It's also a good idea to know your test results and keep a list of the medicines you take. How can you care for yourself at home? · Be safe with medicines. Read and follow all instructions on the label. ? If the doctor gave you a prescription medicine for pain, take it as prescribed. ? If you are not taking a prescription pain medicine, ask your doctor if you can take an over-the-counter medicine. · Even if it hurts, try to cough or take the deepest breath you can at least once every hour. This will get air deeply into your lungs. This may reduce your chance of getting pneumonia or a partial collapse of a lung. Hold a pillow against your chest to make this less painful. · Put ice or a cold pack on the area for 10 to 20 minutes at a time. Put a thin cloth between the ice and your skin. When should you call for help? Call 911 anytime you think you may need emergency care.  For example, call if:    · You have severe trouble breathing.    Call your doctor now or seek immediate medical care if:    · You have some trouble breathing.     · You have a fever.     · You have a new or worse cough.    Watch closely for changes in your health, and be sure to contact your doctor if:    · You have pain even after taking your medicine.     · You do not get better as expected. Where can you learn more? Go to http://josiah-lopez.info/. Enter M135 in the search box to learn more about \"Broken Rib: Care Instructions. \"  Current as of: September 20, 2018  Content Version: 11.9  © 4980-8215 [employer]. Care instructions adapted under license by [employer] (which disclaims liability or warranty for this information). If you have questions about a medical condition or this instruction, always ask your healthcare professional. [address] any warranty or liability for your use of this information.

## 2019-02-25 NOTE — ED NOTES
I have reviewed discharge instructions with the patient. The patient verbalized understanding. Patient left ED via Discharge Method: via wheelchair to Home with daughter. Opportunity for questions and clarification provided. Patient given 1 scripts. To continue your aftercare when you leave the hospital, you may receive an automated call from our care team to check in on how you are doing. This is a free service and part of our promise to provide the best care and service to meet your aftercare needs.  If you have questions, or wish to unsubscribe from this service please call [phone]. Thank you for Choosing our [facility] Emergency Department.

## 2019-02-25 NOTE — ED PROVIDER NOTES
40-year-old white male presents with a right-sided rib pain. He reports that yesterday evening he got up from a recliner and felt lightheaded and fell striking his right side on the ground. No head trauma. No neck pain or back pain. No loss of consciousness. Fall was around 11 PM last night. This morning when he awoke he continued to have right-sided rib pain. No hematuria. The history is provided by the patient. Past Medical History:  
Diagnosis Date  Arthritis  Change in vision  Chronic hip pain 10/20/2016  Edema leg  Endocrine disease   
 hypothyroidism  GERD (gastroesophageal reflux disease)   
 well controlled on meds  High cholesterol  Hypertension  Iron deficiency anemia  Murmur  Muscle weakness  Myalgia  Neurological disorder HA  Pelvic pain in male  Polymyalgia rheumatica ([facility] [address] 10/20/2016  Psychiatric disorder   
 depression  SDH (subdural hematoma) ([facility]) HA, memory loss  Thyroid disease   
 hypo. Past Surgical History:  
Procedure Laterality Date  HX TONSILLECTOMY Family History:  
Problem Relation Age of Onset  Cancer Mother  Heart Disease Father  Hypertension Father Social History Socioeconomic History  Marital status: SINGLE Spouse name: Not on file  Number of children: Not on file  Years of education: Not on file  Highest education level: Not on file Social Needs  Financial resource strain: Not on file  Food insecurity - worry: Not on file  Food insecurity - inability: Not on file  Transportation needs - medical: Not on file  Transportation needs - non-medical: Not on file Occupational History  Not on file Tobacco Use  Smoking status: Former Smoker Packs/day: 2.00 Years: 10.00 Pack years: 20.00 Types: Cigarettes Last attempt to quit: 2006 Years since quittin.0  Smokeless tobacco: Former User  Tobacco comment: Andreea Steward, quit 15 years ago. Substance and Sexual Activity  Alcohol use: Yes Alcohol/week: 7.0 oz Types: 14 Shots of liquor per week  Drug use: No  
 Sexual activity: No  
Other Topics Concern  Not on file Social History Narrative  Not on file ALLERGIES: Pcn [penicillins] and Amoxicillin Review of Systems Respiratory: Negative for shortness of breath. Gastrointestinal: Negative for abdominal pain and vomiting. Musculoskeletal: Negative for back pain and neck pain. Neurological: Negative for headaches. Vitals:  
 02/25/19 1251 BP: 124/84 Pulse: (!) 105 Resp: 16 Temp: 98.7 °F (37.1 °C) SpO2: 98% Weight: 88.9 kg (196 lb) Height: 6' 5\" (1.956 m) Physical Exam  
Constitutional: He is oriented to person, place, and time. He appears well-developed and well-nourished. No distress. HENT:  
Head: Normocephalic and atraumatic. Neck: Normal range of motion. Neck supple. Cardiovascular: Normal rate and regular rhythm. Pulmonary/Chest: Effort normal and breath sounds normal.  
Tenderness to right lateral chest wall without bruising swelling or crepitus. Abdominal: Soft. He exhibits no distension. There is no tenderness. Musculoskeletal: Normal range of motion. He exhibits no edema. Neurological: He is alert and oriented to person, place, and time. Skin: Skin is warm and dry. Psychiatric: He has a normal mood and affect. His behavior is normal.  
Nursing note and vitals reviewed. MDM Number of Diagnoses or Management Options Diagnosis management comments: Chest x-ray confirms right eighth rib fracture with no pneumothorax. Patient declining pain medicine in the emergency department. He appears safe for discharge home. Will be placed on hydrocodone for rib fracture. Amount and/or Complexity of Data Reviewed Tests in the radiology section of CPT®: ordered and reviewed Risk of Complications, Morbidity, and/or Mortality Presenting problems: low Diagnostic procedures: low Management options: low Procedures

## 2019-03-21 PROBLEM — I82.4Y9 DEEP VEIN THROMBOSIS (DVT) OF PROXIMAL LOWER EXTREMITY (HCC): Status: ACTIVE | Noted: 2019-03-21

## 2019-06-18 ENCOUNTER — HOSPITAL ENCOUNTER (OUTPATIENT)
Dept: GENERAL RADIOLOGY | Age: 64
Discharge: HOME | End: 2019-06-18
Attending: INTERNAL MEDICINE
Payer: MEDICARE

## 2019-06-18 DIAGNOSIS — M19.90 INFLAMMATORY ARTHRITIS: ICD-10-CM

## 2019-06-18 DIAGNOSIS — M25.551 PAIN OF BOTH HIP JOINTS: ICD-10-CM

## 2019-06-18 DIAGNOSIS — M25.552 PAIN OF BOTH HIP JOINTS: ICD-10-CM

## 2019-06-18 DIAGNOSIS — M75.51 BURSITIS OF RIGHT SHOULDER: ICD-10-CM

## 2019-06-18 PROCEDURE — 73030 X-RAY EXAM OF SHOULDER: CPT

## 2019-06-18 PROCEDURE — 73130 X-RAY EXAM OF HAND: CPT

## 2019-06-18 PROCEDURE — 72170 X-RAY EXAM OF PELVIS: CPT

## 2020-09-02 ENCOUNTER — APPOINTMENT (OUTPATIENT)
Dept: CT IMAGING | Age: 65
DRG: 644 | End: 2020-09-02
Attending: EMERGENCY MEDICINE
Payer: MEDICARE

## 2020-09-02 ENCOUNTER — HOSPITAL ENCOUNTER (INPATIENT)
Age: 65
LOS: 2 days | Discharge: HOME | DRG: 644 | End: 2020-09-04
Attending: EMERGENCY MEDICINE | Admitting: FAMILY MEDICINE
Payer: MEDICARE

## 2020-09-02 ENCOUNTER — APPOINTMENT (OUTPATIENT)
Dept: GENERAL RADIOLOGY | Age: 65
DRG: 644 | End: 2020-09-02
Attending: EMERGENCY MEDICINE
Payer: MEDICARE

## 2020-09-02 DIAGNOSIS — E03.9 MYXEDEMA: Primary | ICD-10-CM

## 2020-09-02 PROBLEM — I26.99 PULMONARY EMBOLI (HCC): Status: RESOLVED | Noted: 2018-12-21 | Resolved: 2020-09-02

## 2020-09-02 PROBLEM — E53.8 B12 DEFICIENCY: Status: RESOLVED | Noted: 2018-10-19 | Resolved: 2020-09-02

## 2020-09-02 PROBLEM — I82.4Y9 DEEP VEIN THROMBOSIS (DVT) OF PROXIMAL LOWER EXTREMITY (HCC): Status: RESOLVED | Noted: 2019-03-21 | Resolved: 2020-09-02

## 2020-09-02 PROBLEM — E03.5 MYXEDEMA COMA (HCC): Status: ACTIVE | Noted: 2020-09-02

## 2020-09-02 PROBLEM — R06.02 SOB (SHORTNESS OF BREATH) ON EXERTION: Status: RESOLVED | Noted: 2018-12-20 | Resolved: 2020-09-02

## 2020-09-02 LAB
ALBUMIN SERPL-MCNC: 4.2 G/DL (ref 3.2–4.6)
ALBUMIN/GLOB SERPL: 1.3 {RATIO} (ref 1.2–3.5)
ALP SERPL-CCNC: 48 U/L (ref 50–136)
ALT SERPL-CCNC: 42 U/L (ref 12–65)
ANION GAP SERPL CALC-SCNC: 9 MMOL/L (ref 7–16)
AST SERPL-CCNC: 40 U/L (ref 15–37)
BILIRUB SERPL-MCNC: 0.5 MG/DL (ref 0.2–1.1)
BNP SERPL-MCNC: 156 PG/ML (ref 5–125)
BUN SERPL-MCNC: 20 MG/DL (ref 8–23)
CALCIUM SERPL-MCNC: 9.3 MG/DL (ref 8.3–10.4)
CHLORIDE SERPL-SCNC: 104 MMOL/L (ref 98–107)
CO2 SERPL-SCNC: 25 MMOL/L (ref 21–32)
CORTIS PM SERPL-MCNC: 12.2 UG/DL (ref 2–14)
CREAT SERPL-MCNC: 1.37 MG/DL (ref 0.8–1.5)
D DIMER PPP FEU-MCNC: 0.43 UG/ML(FEU)
ERYTHROCYTE [DISTWIDTH] IN BLOOD BY AUTOMATED COUNT: 13.9 % (ref 11.9–14.6)
GLOBULIN SER CALC-MCNC: 3.2 G/DL (ref 2.3–3.5)
GLUCOSE SERPL-MCNC: 108 MG/DL (ref 65–100)
HCT VFR BLD AUTO: 38.1 % (ref 41.1–50.3)
HGB BLD-MCNC: 13.1 G/DL (ref 13.6–17.2)
MCH RBC QN AUTO: 33.9 PG (ref 26.1–32.9)
MCHC RBC AUTO-ENTMCNC: 34.4 G/DL (ref 31.4–35)
MCV RBC AUTO: 98.4 FL (ref 79.6–97.8)
NRBC # BLD: 0 K/UL (ref 0–0.2)
PLATELET # BLD AUTO: 177 K/UL (ref 150–450)
PMV BLD AUTO: 10.8 FL (ref 9.4–12.3)
POTASSIUM SERPL-SCNC: 3.3 MMOL/L (ref 3.5–5.1)
PROT SERPL-MCNC: 7.4 G/DL (ref 6.3–8.2)
RBC # BLD AUTO: 3.87 M/UL (ref 4.23–5.6)
SODIUM SERPL-SCNC: 138 MMOL/L (ref 136–145)
T3 SERPL-MCNC: 0.02 NG/ML (ref 0.6–1.81)
T4 FREE SERPL-MCNC: 0.3 NG/DL (ref 0.9–1.8)
TROPONIN-HIGH SENSITIVITY: 7.8 PG/ML (ref 0–14)
TSH SERPL DL<=0.005 MIU/L-ACNC: 156 UIU/ML
WBC # BLD AUTO: 5.3 K/UL (ref 4.3–11.1)

## 2020-09-02 PROCEDURE — 82533 TOTAL CORTISOL: CPT

## 2020-09-02 PROCEDURE — 74011250636 HC RX REV CODE- 250/636: Performed by: FAMILY MEDICINE

## 2020-09-02 PROCEDURE — 84439 ASSAY OF FREE THYROXINE: CPT

## 2020-09-02 PROCEDURE — 93005 ELECTROCARDIOGRAM TRACING: CPT | Performed by: EMERGENCY MEDICINE

## 2020-09-02 PROCEDURE — 83880 ASSAY OF NATRIURETIC PEPTIDE: CPT

## 2020-09-02 PROCEDURE — 85027 COMPLETE CBC AUTOMATED: CPT

## 2020-09-02 PROCEDURE — 74011250636 HC RX REV CODE- 250/636: Performed by: EMERGENCY MEDICINE

## 2020-09-02 PROCEDURE — 84443 ASSAY THYROID STIM HORMONE: CPT

## 2020-09-02 PROCEDURE — 87635 SARS-COV-2 COVID-19 AMP PRB: CPT

## 2020-09-02 PROCEDURE — 85379 FIBRIN DEGRADATION QUANT: CPT

## 2020-09-02 PROCEDURE — 84480 ASSAY TRIIODOTHYRONINE (T3): CPT

## 2020-09-02 PROCEDURE — 99285 EMERGENCY DEPT VISIT HI MDM: CPT

## 2020-09-02 PROCEDURE — 70450 CT HEAD/BRAIN W/O DYE: CPT

## 2020-09-02 PROCEDURE — 80053 COMPREHEN METABOLIC PANEL: CPT

## 2020-09-02 PROCEDURE — 74011250637 HC RX REV CODE- 250/637: Performed by: FAMILY MEDICINE

## 2020-09-02 PROCEDURE — 84484 ASSAY OF TROPONIN QUANT: CPT

## 2020-09-02 PROCEDURE — 74011000250 HC RX REV CODE- 250: Performed by: FAMILY MEDICINE

## 2020-09-02 PROCEDURE — 65610000001 HC ROOM ICU GENERAL

## 2020-09-02 PROCEDURE — 94761 N-INVAS EAR/PLS OXIMETRY MLT: CPT

## 2020-09-02 PROCEDURE — 71045 X-RAY EXAM CHEST 1 VIEW: CPT

## 2020-09-02 PROCEDURE — 96374 THER/PROPH/DIAG INJ IV PUSH: CPT

## 2020-09-02 RX ORDER — ONDANSETRON 2 MG/ML
4 INJECTION INTRAMUSCULAR; INTRAVENOUS
Status: DISCONTINUED | OUTPATIENT
Start: 2020-09-02 | End: 2020-09-04 | Stop reason: HOSPADM

## 2020-09-02 RX ORDER — HYDROCORTISONE SODIUM SUCCINATE 100 MG/2ML
100 INJECTION, POWDER, FOR SOLUTION INTRAMUSCULAR; INTRAVENOUS
Status: COMPLETED | OUTPATIENT
Start: 2020-09-02 | End: 2020-09-02

## 2020-09-02 RX ORDER — SODIUM CHLORIDE 0.9 % (FLUSH) 0.9 %
5-40 SYRINGE (ML) INJECTION AS NEEDED
Status: DISCONTINUED | OUTPATIENT
Start: 2020-09-02 | End: 2020-09-04 | Stop reason: HOSPADM

## 2020-09-02 RX ORDER — SODIUM CHLORIDE 9 MG/ML
500 INJECTION, SOLUTION INTRAVENOUS ONCE
Status: COMPLETED | OUTPATIENT
Start: 2020-09-02 | End: 2020-09-02

## 2020-09-02 RX ORDER — ACETAMINOPHEN 325 MG/1
650 TABLET ORAL
Status: DISCONTINUED | OUTPATIENT
Start: 2020-09-02 | End: 2020-09-04 | Stop reason: HOSPADM

## 2020-09-02 RX ORDER — MECLIZINE HYDROCHLORIDE 25 MG/1
25 TABLET ORAL
Status: DISCONTINUED | OUTPATIENT
Start: 2020-09-02 | End: 2020-09-02

## 2020-09-02 RX ORDER — FAMOTIDINE 20 MG/1
20 TABLET, FILM COATED ORAL 2 TIMES DAILY
Status: DISCONTINUED | OUTPATIENT
Start: 2020-09-02 | End: 2020-09-04 | Stop reason: HOSPADM

## 2020-09-02 RX ORDER — SODIUM CHLORIDE 0.9 % (FLUSH) 0.9 %
5-40 SYRINGE (ML) INJECTION EVERY 8 HOURS
Status: DISCONTINUED | OUTPATIENT
Start: 2020-09-02 | End: 2020-09-04 | Stop reason: HOSPADM

## 2020-09-02 RX ORDER — ENOXAPARIN SODIUM 100 MG/ML
40 INJECTION SUBCUTANEOUS DAILY
Status: DISCONTINUED | OUTPATIENT
Start: 2020-09-03 | End: 2020-09-04 | Stop reason: HOSPADM

## 2020-09-02 RX ORDER — ACETAMINOPHEN 650 MG/1
650 SUPPOSITORY RECTAL
Status: DISCONTINUED | OUTPATIENT
Start: 2020-09-02 | End: 2020-09-04 | Stop reason: HOSPADM

## 2020-09-02 RX ORDER — SENNOSIDES 8.6 MG/1
1 TABLET ORAL DAILY PRN
Status: DISCONTINUED | OUTPATIENT
Start: 2020-09-02 | End: 2020-09-04 | Stop reason: HOSPADM

## 2020-09-02 RX ORDER — PROMETHAZINE HYDROCHLORIDE 25 MG/1
12.5 TABLET ORAL
Status: DISCONTINUED | OUTPATIENT
Start: 2020-09-02 | End: 2020-09-04 | Stop reason: HOSPADM

## 2020-09-02 RX ORDER — SODIUM CHLORIDE 9 MG/ML
75 INJECTION, SOLUTION INTRAVENOUS CONTINUOUS
Status: DISPENSED | OUTPATIENT
Start: 2020-09-02 | End: 2020-09-03

## 2020-09-02 RX ADMIN — FAMOTIDINE 20 MG: 20 TABLET, FILM COATED ORAL at 22:00

## 2020-09-02 RX ADMIN — HYDROCORTISONE SODIUM SUCCINATE 100 MG: 100 INJECTION, POWDER, FOR SOLUTION INTRAMUSCULAR; INTRAVENOUS at 21:14

## 2020-09-02 RX ADMIN — SODIUM CHLORIDE 500 ML: 900 INJECTION, SOLUTION INTRAVENOUS at 17:52

## 2020-09-02 RX ADMIN — Medication 10 ML: at 22:00

## 2020-09-02 RX ADMIN — SODIUM CHLORIDE 75 ML/HR: 9 INJECTION, SOLUTION INTRAVENOUS at 22:00

## 2020-09-02 RX ADMIN — LEVOTHYROXINE SODIUM ANHYDROUS 200 MCG: 100 INJECTION, POWDER, LYOPHILIZED, FOR SOLUTION INTRAVENOUS at 22:00

## 2020-09-02 NOTE — ED TRIAGE NOTES
Pt states he had bilateral PEs last year and has been extremely SOB today. States the grass was mowed at his apartment so he thought it may be allergies but it is not getting any better. Pt states he is not on prescription medications because he cannot afford them. Pt has a mask on in triage.

## 2020-09-02 NOTE — ED PROVIDER NOTES
57-year-old male with a history of pulmonary embolism, polymyalgia rheumatica, hypothyroidism with medication noncompliance for about 10 months presents with generalized weakness, fatigue, trouble with his balance and shortness of breath for 1 week. He denies any chest pain or fever but does have an occasional cough. He denies any numbness tingling or weakness on the left or right. He denies any headaches. He denies spinning sensation. The history is provided by the patient. Shortness of Breath   This is a recurrent problem. The average episode lasts 1 week. The problem occurs continuously. The current episode started more than 1 week ago. The problem has been gradually worsening. Associated symptoms include sore throat. Pertinent negatives include no fever, no headaches, no coryza, no rhinorrhea, no cough, no chest pain, no vomiting, no abdominal pain, no leg pain and no leg swelling. He has tried nothing for the symptoms. Fatigue   This is a new problem. The current episode started more than 1 week ago. The problem has been gradually worsening. There was no focality noted. Primary symptoms include loss of balance and movement disorder. Pertinent negatives include no loss of sensation and no speech difficulty. There has been no fever. Associated symptoms include shortness of breath and nausea. Pertinent negatives include no chest pain, no vomiting, no confusion and no headaches.         Past Medical History:   Diagnosis Date    Arthritis     Change in vision     Chronic hip pain 10/20/2016    Edema leg     Endocrine disease     hypothyroidism    GERD (gastroesophageal reflux disease)     well controlled on meds    High cholesterol     Hypertension     Iron deficiency anemia     Murmur     Muscle weakness     Myalgia     Neurological disorder     HA    Pelvic pain in male     Polymyalgia rheumatica ([facility] [address] 10/20/2016    Psychiatric disorder     depression    SDH (subdural hematoma) ([facility] [address] HA, memory loss    Thyroid disease     hypo. Past Surgical History:   Procedure Laterality Date    HX OTHER SURGICAL      Subdural Hematoma    HX TONSILLECTOMY           Family History:   Problem Relation Age of Onset    Cancer Mother     Hypertension Father     Heart Attack Father     Heart Attack Brother        Social History     Socioeconomic History    Marital status: SINGLE     Spouse name: Not on file    Number of children: Not on file    Years of education: Not on file    Highest education level: Not on file   Occupational History    Not on file   Social Needs    Financial resource strain: Not on file    Food insecurity     Worry: Not on file     Inability: Not on file    Transportation needs     Medical: Not on file     Non-medical: Not on file   Tobacco Use    Smoking status: Former Smoker     Packs/day: 2.00     Years: 10.00     Pack years: 20.00     Types: Cigarettes     Last attempt to quit: 1999     Years since quittin.6    Smokeless tobacco: Former User    Tobacco comment: [facility], quit 15 years ago. Substance and Sexual Activity    Alcohol use:  Yes     Alcohol/week: 11.7 standard drinks     Types: 14 Shots of liquor per week    Drug use: No    Sexual activity: Never   Lifestyle    Physical activity     Days per week: Not on file     Minutes per session: Not on file    Stress: Not on file   Relationships    Social connections     Talks on phone: Not on file     Gets together: Not on file     Attends Jainism service: Not on file     Active member of club or organization: Not on file     Attends meetings of clubs or organizations: Not on file     Relationship status: Not on file    Intimate partner violence     Fear of current or ex partner: Not on file     Emotionally abused: Not on file     Physically abused: Not on file     Forced sexual activity: Not on file   Other Topics Concern    Not on file   Social History Narrative    Not on file         ALLERGIES: Pcn [penicillins] and Amoxicillin    Review of Systems   Constitutional: Positive for fatigue. Negative for chills and fever. HENT: Positive for sore throat. Negative for congestion and rhinorrhea. Eyes: Negative for photophobia and redness. Respiratory: Positive for shortness of breath. Negative for cough. Cardiovascular: Negative for chest pain and leg swelling. Gastrointestinal: Positive for nausea. Negative for abdominal pain, diarrhea and vomiting. Endocrine: Negative for polydipsia and polyuria. Genitourinary: Negative for dysuria. Musculoskeletal: Negative for back pain and myalgias. Neurological: Positive for loss of balance. Negative for speech difficulty, weakness, numbness and headaches. Psychiatric/Behavioral: Negative for confusion. Vitals:    09/02/20 1653   BP: (!) 139/103   Pulse: 88   Resp: 16   Temp: 98 °F (36.7 °C)   SpO2: 94%   Weight: 90.7 kg (200 lb)   Height: 6' 5\" (1.956 m)            Physical Exam  Vitals signs and nursing note reviewed. Constitutional:       Appearance: He is well-developed. HENT:      Mouth/Throat:      Pharynx: No oropharyngeal exudate. Eyes:      Conjunctiva/sclera: Conjunctivae normal.      Pupils: Pupils are equal, round, and reactive to light. Neck:      Musculoskeletal: Neck supple. Cardiovascular:      Rate and Rhythm: Normal rate and regular rhythm. Heart sounds: Normal heart sounds. Pulmonary:      Effort: Pulmonary effort is normal.      Breath sounds: Normal breath sounds. Abdominal:      General: Bowel sounds are normal. There is no distension. Palpations: Abdomen is soft. Tenderness: There is no abdominal tenderness. There is no guarding or rebound. Musculoskeletal: Normal range of motion. General: No tenderness. Lymphadenopathy:      Cervical: No cervical adenopathy. Skin:     General: Skin is warm and dry. Neurological:      General: No focal deficit present.       Mental Status: He is alert and oriented to person, place, and time. Cranial Nerves: No cranial nerve deficit. MDM  Number of Diagnoses or Management Options  Diagnosis management comments: Patient with complaint of trouble walking and extreme fatigue. He had a normal neurologic exam and cerebellar exam.  CT scan of the brain was negative. D-dimer was normal so doubt PE. No pleuritic chest pain. Patient noncompliant with all of his medications for at least 10 months perhaps longer and was found to have a elevated TSH rather dramatically. T4 and T3 pending. Myxedema present. Cortisol level evening to be drawn and Solu-Cortef to be administered until adrenal insufficiency is ruled out. IV thyroxine per admitting service.        Amount and/or Complexity of Data Reviewed  Clinical lab tests: ordered and reviewed (Results for orders placed or performed during the hospital encounter of 09/02/20  -CBC W/O DIFF       Result                      Value             Ref Range           WBC                         5.3               4.3 - 11.1 K*       RBC                         3.87 (L)          4.23 - 5.6 M*       HGB                         13.1 (L)          13.6 - 17.2 *       HCT                         38.1 (L)          41.1 - 50.3 %       MCV                         98.4 (H)          79.6 - 97.8 *       MCH                         33.9 (H)          26.1 - 32.9 *       MCHC                        34.4              31.4 - 35.0 *       RDW                         13.9              11.9 - 14.6 %       PLATELET                    177               150 - 450 K/*       MPV                         10.8              9.4 - 12.3 FL       ABSOLUTE NRBC               0.00              0.0 - 0.2 K/*  -METABOLIC PANEL, COMPREHENSIVE       Result                      Value             Ref Range           Sodium                      138               136 - 145 mm*       Potassium                   3.3 (L)           3.5 - 5.1 mm* Chloride                    104               98 - 107 mmo*       CO2                         25                21 - 32 mmol*       Anion gap                   9                 7 - 16 mmol/L       Glucose                     108 (H)           65 - 100 mg/*       BUN                         20                8 - 23 MG/DL        Creatinine                  1.37              0.8 - 1.5 MG*       GFR est AA                  >60               >60 ml/min/1*       GFR est non-AA              55 (L)            >60 ml/min/1*       Calcium                     9.3               8.3 - 10.4 M*       Bilirubin, total            0.5               0.2 - 1.1 MG*       ALT (SGPT)                  42                12 - 65 U/L         AST (SGOT)                  40 (H)            15 - 37 U/L         Alk.  phosphatase            48 (L)            50 - 136 U/L        Protein, total              7.4               6.3 - 8.2 g/*       Albumin                     4.2               3.2 - 4.6 g/*       Globulin                    3.2               2.3 - 3.5 g/*       A-G Ratio                   1.3               1.2 - 3.5      -D DIMER       Result                      Value             Ref Range           D DIMER                     0.43              <0.56 ug/ml(*  -TROPONIN-HIGH SENSITIVITY       Result                      Value             Ref Range           Troponin-High Sensitiv*     7.8               0 - 14 pg/mL   -TSH 3RD GENERATION       Result                      Value             Ref Range           TSH                         156.000           uIU/mL         -NT-PRO BNP       Result                      Value             Ref Range           NT pro-BNP                  156 (H)           5 - 125 PG/ML  )  Tests in the radiology section of CPT®: ordered and reviewed (Xr Chest Port    Result Date: 9/2/2020  EXAM: XR CHEST PORT INDICATION: sob COMPARISON: Chest x-ray, 2/25/2019 FINDINGS: The cardiomediastinal silhouette is within normal limits. No focal parenchymal process. No pleural effusion. No pneumothorax. No acute osseous abnormality. IMPRESSION: No acute cardiopulmonary abnormality.     )  Discuss the patient with other providers: yes (Lorraine with intensivist, Dr. Ken Manning who did not feel like he needed to be at the [facility] at this time unless he ended up requiring intubation.   Hospitalist consulted for admission)           Procedures

## 2020-09-03 PROBLEM — N17.9 AKI (ACUTE KIDNEY INJURY) (HCC): Status: ACTIVE | Noted: 2020-09-03

## 2020-09-03 LAB
ANION GAP SERPL CALC-SCNC: 7 MMOL/L (ref 7–16)
ATRIAL RATE: 65 BPM
BASOPHILS # BLD: 0 K/UL (ref 0–0.2)
BASOPHILS NFR BLD: 0 % (ref 0–2)
BUN SERPL-MCNC: 17 MG/DL (ref 8–23)
CALCIUM SERPL-MCNC: 9.3 MG/DL (ref 8.3–10.4)
CALCULATED P AXIS, ECG09: 39 DEGREES
CALCULATED R AXIS, ECG10: 90 DEGREES
CALCULATED T AXIS, ECG11: 103 DEGREES
CHLORIDE SERPL-SCNC: 105 MMOL/L (ref 98–107)
CO2 SERPL-SCNC: 26 MMOL/L (ref 21–32)
COVID-19 RAPID TEST, COVR: NOT DETECTED
CREAT SERPL-MCNC: 1.35 MG/DL (ref 0.8–1.5)
DIAGNOSIS, 93000: NORMAL
DIFFERENTIAL METHOD BLD: ABNORMAL
EOSINOPHIL # BLD: 0 K/UL (ref 0–0.8)
EOSINOPHIL NFR BLD: 1 % (ref 0.5–7.8)
ERYTHROCYTE [DISTWIDTH] IN BLOOD BY AUTOMATED COUNT: 13.8 % (ref 11.9–14.6)
GLUCOSE SERPL-MCNC: 126 MG/DL (ref 65–100)
HCT VFR BLD AUTO: 40.3 % (ref 41.1–50.3)
HGB BLD-MCNC: 13.4 G/DL (ref 13.6–17.2)
IMM GRANULOCYTES # BLD AUTO: 0 K/UL (ref 0–0.5)
IMM GRANULOCYTES NFR BLD AUTO: 0 % (ref 0–5)
LYMPHOCYTES # BLD: 0.6 K/UL (ref 0.5–4.6)
LYMPHOCYTES NFR BLD: 10 % (ref 13–44)
MAGNESIUM SERPL-MCNC: 2.8 MG/DL (ref 1.8–2.4)
MCH RBC QN AUTO: 32.8 PG (ref 26.1–32.9)
MCHC RBC AUTO-ENTMCNC: 33.3 G/DL (ref 31.4–35)
MCV RBC AUTO: 98.8 FL (ref 79.6–97.8)
MONOCYTES # BLD: 0.2 K/UL (ref 0.1–1.3)
MONOCYTES NFR BLD: 3 % (ref 4–12)
NEUTS SEG # BLD: 5.3 K/UL (ref 1.7–8.2)
NEUTS SEG NFR BLD: 86 % (ref 43–78)
NRBC # BLD: 0 K/UL (ref 0–0.2)
P-R INTERVAL, ECG05: 208 MS
PLATELET # BLD AUTO: 191 K/UL (ref 150–450)
PMV BLD AUTO: 11 FL (ref 9.4–12.3)
POTASSIUM SERPL-SCNC: 3.9 MMOL/L (ref 3.5–5.1)
Q-T INTERVAL, ECG07: 392 MS
QRS DURATION, ECG06: 94 MS
QTC CALCULATION (BEZET), ECG08: 407 MS
RBC # BLD AUTO: 4.08 M/UL (ref 4.23–5.6)
SARS COV-2, XPGCVT: NEGATIVE
SODIUM SERPL-SCNC: 138 MMOL/L (ref 136–145)
SOURCE, COVRS: NORMAL
VENTRICULAR RATE, ECG03: 65 BPM
WBC # BLD AUTO: 6.1 K/UL (ref 4.3–11.1)

## 2020-09-03 PROCEDURE — 85025 COMPLETE CBC W/AUTO DIFF WBC: CPT

## 2020-09-03 PROCEDURE — 74011250636 HC RX REV CODE- 250/636: Performed by: FAMILY MEDICINE

## 2020-09-03 PROCEDURE — 74011250637 HC RX REV CODE- 250/637: Performed by: HOSPITALIST

## 2020-09-03 PROCEDURE — 65270000029 HC RM PRIVATE

## 2020-09-03 PROCEDURE — 80048 BASIC METABOLIC PNL TOTAL CA: CPT

## 2020-09-03 PROCEDURE — 97165 OT EVAL LOW COMPLEX 30 MIN: CPT

## 2020-09-03 PROCEDURE — 97161 PT EVAL LOW COMPLEX 20 MIN: CPT

## 2020-09-03 PROCEDURE — 83735 ASSAY OF MAGNESIUM: CPT

## 2020-09-03 PROCEDURE — 36415 COLL VENOUS BLD VENIPUNCTURE: CPT

## 2020-09-03 PROCEDURE — 74011250637 HC RX REV CODE- 250/637: Performed by: FAMILY MEDICINE

## 2020-09-03 RX ORDER — AMLODIPINE BESYLATE 5 MG/1
5 TABLET ORAL DAILY
Status: DISCONTINUED | OUTPATIENT
Start: 2020-09-03 | End: 2020-09-04

## 2020-09-03 RX ORDER — LISINOPRIL 5 MG/1
10 TABLET ORAL DAILY
Status: DISCONTINUED | OUTPATIENT
Start: 2020-09-03 | End: 2020-09-03

## 2020-09-03 RX ORDER — LEVOTHYROXINE SODIUM 100 UG/1
100 TABLET ORAL
Status: DISCONTINUED | OUTPATIENT
Start: 2020-09-03 | End: 2020-09-04 | Stop reason: HOSPADM

## 2020-09-03 RX ADMIN — ENOXAPARIN SODIUM 40 MG: 40 INJECTION SUBCUTANEOUS at 07:53

## 2020-09-03 RX ADMIN — SODIUM CHLORIDE 75 ML/HR: 9 INJECTION, SOLUTION INTRAVENOUS at 12:15

## 2020-09-03 RX ADMIN — Medication 10 ML: at 14:17

## 2020-09-03 RX ADMIN — LEVOTHYROXINE SODIUM 100 MCG: 0.1 TABLET ORAL at 07:52

## 2020-09-03 RX ADMIN — Medication 10 ML: at 21:07

## 2020-09-03 RX ADMIN — AMLODIPINE BESYLATE 5 MG: 5 TABLET ORAL at 09:41

## 2020-09-03 RX ADMIN — FAMOTIDINE 20 MG: 20 TABLET, FILM COATED ORAL at 09:41

## 2020-09-03 RX ADMIN — Medication 10 ML: at 06:00

## 2020-09-03 RX ADMIN — FAMOTIDINE 20 MG: 20 TABLET, FILM COATED ORAL at 17:20

## 2020-09-03 NOTE — ROUTINE PROCESS
TRANSFER - OUT REPORT: 
 
Verbal report given to Hermes Sesay on Elodia Berger  being transferred to ICU rm # [id number] for routine progression of care Report consisted of patients Situation, Background, Assessment and  
Recommendations(SBAR). Information from the following report(s) SBAR was reviewed with the receiving nurse. Lines:  
Peripheral IV 09/02/20 Right Antecubital (Active) Opportunity for questions and clarification was provided. Patient transported with: 
 Monitor

## 2020-09-03 NOTE — PROGRESS NOTES
Bedside and Verbal shift change report given to self (oncoming nurse) by Alcides Hui RN (offgoing nurse). Report included the following information SBAR, Kardex, MAR and Recent Results.

## 2020-09-03 NOTE — PROGRESS NOTES
Pt arrived to unit via stretcher with Mellisa Florian. Pt alert and oriented x4. VSS on monitor. Temp 97.1 axillary. HR in the 60's. States he is in no pain, but he is very cold with noticeable shivering. RR 16-18. No edema. Heart sounds s1 and s2 audible. No murmurs heard. Breath sounds are clear bilaterally. Bowel sounds active in all 4 quadrants. No N/V/D at this time. No pain at this time. He is hard of hearing and has glasses at the bedside that his daughter, Eliana Valderrama, sent up for him. Skin is of appropriate color with multiple tattoos on both arms, legs, and chest/shoulders. He has multiple small scabs/bruises on his lower and upper extremity. No pressure injury noted. Allevyn prophylacticly placed to the sacrum/coccyx. Patients cane, clothes, and hat are at bedside with him. Pt does NOT wanted to be intubated but is okay with compressions, shock, and ACLS/BLS medications. Pt states that he was prescribed BP/heart and thyroid medications years ago, but stopped taking them a couple years ago due to how expensive the medications were. States he had to see a specialist for prescriptions which was too expensive, and that the medications themselves were too expensive for him to pay for and be able to eat. States he would rather be able to feed himself and his family than pay for specialty doctors and medications. States his daughter had to force him to come to the ER because he won't have the money to pay for this hospitalization. Will need help financially for this hospitalization and help after being discharged.

## 2020-09-03 NOTE — PROGRESS NOTES
Problem: Mobility Impaired (Adult and Pediatric)  Goal: *Acute Goals and Plan of Care (Insert Text)  Outcome: Progressing Towards Goal  Note:   LTG:  (1.)Mr. Russell will move from supine to sit and sit to supine  in bed with SUPERVISION within 7 treatment day(s). (2.)Mr. Russell will transfer from bed to chair and chair to bed with SUPERVISION using the least restrictive device within 7 treatment day(s). (3.)Mr. Russell will ambulate with SUPERVISION for 400 feet with the least restrictive device within 7 treatment day(s). (4)Mr. Russell will go up 3 steps with cane and rail CGa in 7 days. (5)Mr. Russell will perform HEP with cues and assistance to increase safety on his feet in 7 days. ________________________________________________________________________________________________       PHYSICAL THERAPY: Initial Assessment and PM 9/3/2020  INPATIENT: PT Visit Days : 1  Payor: Christi Mercado / Plan: Anastacia Morales / Product Type: [employer] Care Medicare /       NAME/AGE/GENDER: Jyoti Kate is a 77 y.o. male   PRIMARY DIAGNOSIS: Myxedema coma ([facility] [facility] [E03.5] Myxedema coma ([facility] [address] Myxedema coma ([facility] [address]        ICD-10: Treatment Diagnosis:    Generalized Muscle Weakness (M62.81)  Other abnormalities of gait and mobility (R26.89)   Precaution/Allergies:  Pcn [penicillins] and Amoxicillin      ASSESSMENT:     Mr. Jeffery Andrews presents with general weakness with decreased functional mobility. He lives alone and uses a cane for gait. He sat up, stood with cane, and took steps to the chair CGA with cane. He has some unsteadiness on his feet and general weakness. He reports he feels better but not back to normal.   Will follow.     This section established at most recent assessment   PROBLEM LIST (Impairments causing functional limitations):  Decreased Strength  Decreased ADL/Functional Activities  Decreased Transfer Abilities  Decreased Ambulation Ability/Technique  Decreased Balance  Decreased Activity Tolerance  Increased Fatigue  Decreased Mcgrew with Home Exercise Program   INTERVENTIONS PLANNED: (Benefits and precautions of physical therapy have been discussed with the patient.)  Balance Exercise  Bed Mobility  Family Education  Gait Training  Home Exercise Program (HEP)  Range of Motion (ROM)  Therapeutic Activites  Therapeutic Exercise/Strengthening  Transfer Training     TREATMENT PLAN: Frequency/Duration: daily for duration of hospital stay  Rehabilitation Potential For Stated Goals: Good     REHAB RECOMMENDATIONS (at time of discharge pending progress):    Placement:  HHPT? Equipment:   None at this time              HISTORY:   History of Present Injury/Illness (Reason for Referral):  CC: Reason for admission is: myxedema/ severe hypothyroidism     HPI:   Patient history was obtained from the ER provider prior to seeing the patient. Patient is a 77 y.o. male who presents to the ER due to multiple symptoms. His daughter apparently had to almost force him to come in. Most significant is overall weakness, fatigue, imbalance, shortness of breath. Symptoms have been going on for at least a week but many of his symptoms for no longer and have just been more progressive. Denies any fever/chills [although daughter reports he is always cold], NVD, chest pain, leg swelling, abdominal pain, focal weakness or numbness and tingling. There is some question of altered mental status. He is more alert during my exam.  Daughter states she is unsure if he has had confusion, or with his difficulty hearing and severe fatigue, she thinks he may not just understand what is being asked. He admits to not taking medications or seeing his physicians and approximately a year due to financial problems. ER work-up showed him to have severe hypothyroidism and almost borderline myxedema coma.   I did assure patient and daughter that thyroid medication is very cheap and can be obtained at [facility] for $10 for 90-day supply. However he cannot afford to go to doctors to get the prescription. Past Medical History/Comorbidities:   Mr. Ata Davis  has a past medical history of Arthritis, B12 deficiency (10/19/2018), Change in vision, Chronic hip pain (10/20/2016), Deep vein thrombosis (DVT) of proximal lower extremity ([facility] [address] (3/21/2019), Edema leg, Endocrine disease, GERD (gastroesophageal reflux disease), High cholesterol, Hypertension, Iron deficiency anemia, Murmur, Muscle weakness, Myalgia, Neurological disorder, Pelvic pain in male, Polymyalgia rheumatica ([facility] [address] (10/20/2016), Psychiatric disorder, Pulmonary emboli ([facility] [address] (12/21/2018), SDH (subdural hematoma) ([facility] [address] and Thyroid disease.   Mr. Ata Davis  has a past surgical history that includes hx tonsillectomy and hx other surgical.  Social History/Living Environment:   Home Environment: Apartment  # Steps to Enter: 3  Rails to Enter: No  One/Two Story Residence: One story  Living Alone: Yes  Support Systems: Child(marlon)  Patient Expects to be Discharged to[de-identified] Apartment  Current DME Used/Available at Home: Cane, straight  Prior Level of Function/Work/Activity:  Gait with cane     Number of Personal Factors/Comorbidities that affect the Plan of Care: 1-2: MODERATE COMPLEXITY   EXAMINATION:   Most Recent Physical Functioning:   Gross Assessment:  AROM: Within functional limits(LE's)  Strength: Generally decreased, functional(LE's)               Posture:     Balance:  Sitting: Intact  Standing: With support Bed Mobility:  Supine to Sit: Stand-by assistance  Wheelchair Mobility:     Transfers:  Sit to Stand: Contact guard assistance  Stand to Sit: Contact guard assistance  Bed to Chair: Contact guard assistance  Gait:     Speed/Eloise: Delayed  Step Length: Left shortened;Right shortened  Gait Abnormalities: Decreased step clearance  Distance (ft): 4 Feet (ft)  Assistive Device: Walker, rolling  Ambulation - Level of Assistance: Contact guard assistance  Interventions: Safety awareness training;Verbal cues      Body Structures Involved:  Bones  Joints  Muscles  Ligaments Body Functions Affected: Movement Related Activities and Participation Affected: Mobility   Number of elements that affect the Plan of Care: 3: MODERATE COMPLEXITY   CLINICAL PRESENTATION:   Presentation: Stable and uncomplicated: LOW COMPLEXITY   CLINICAL DECISION MAKIN[address] [place] [id number] AM-PAC 6 Clicks   Basic Mobility Inpatient Short Form  How much difficulty does the patient currently have. .. Unable A Lot A Little None   1. Turning over in bed (including adjusting bedclothes, sheets and blankets)? [] 1   [] 2   [x] 3   [] 4   2. Sitting down on and standing up from a chair with arms ( e.g., wheelchair, bedside commode, etc.)   [] 1   [] 2   [x] 3   [] 4   3. Moving from lying on back to sitting on the side of the bed? [] 1   [] 2   [x] 3   [] 4   How much help from another person does the patient currently need. .. Total A Lot A Little None   4. Moving to and from a bed to a chair (including a wheelchair)? [] 1   [] 2   [x] 3   [] 4   5. Need to walk in hospital room? [] 1   [] 2   [x] 3   [] 4   6. Climbing 3-5 steps with a railing? [] 1   [] 2   [x] 3   [] 4   © , Trustees of [address] [place] [address] [address] [address], under license to [employer]. All rights reserved      Score:  Initial: 18 Most Recent: X (Date: -- )    Interpretation of Tool:  Represents activities that are increasingly more difficult (i.e. Bed mobility, Transfers, Gait). Medical Necessity:     Patient is expected to demonstrate progress in   strength, range of motion, and balance   to   decrease assistance required with exercises and functional mobility  . Reason for Services/Other Comments:  Patient   continues to require present interventions due to patient's inability to perform exercises and functional mobility independently  .    Use of outcome tool(s) and clinical judgement create a POC that gives a: Clear prediction of patient's progress: LOW COMPLEXITY            TREATMENT:   (In addition to Assessment/Re-Assessment sessions the following treatments were rendered)   Pre-treatment Symptoms/Complaints:  fatigue  Pain: Initial:      Post Session:       Assessment/Reassessment only, no treatment provided today    Braces/Orthotics/Lines/Etc:   ICU lines   O2 Device: Room air  Treatment/Session Assessment:    Response to Treatment:  did fine  Interdisciplinary Collaboration:   Occupational Therapist  Registered Nurse  After treatment position/precautions:   Up in chair  Bed/Chair-wheels locked  Bed in low position  Call light within reach  RN notified   Compliance with Program/Exercises: Will assess as treatment progresses  Recommendations/Intent for next treatment session: \"Next visit will focus on advancements to more challenging activities and reduction in assistance provided\".   Total Treatment Duration:  PT Patient Time In/Time Out  Time In: 1540  Time Out: [address], PT

## 2020-09-03 NOTE — PROGRESS NOTES
Care Management Interventions  Transition of Care Consult (CM Consult): Discharge Planning  Physical Therapy Consult: Yes  Occupational Therapy Consult: Yes  Current Support Network: Own Home, Lives Alone  Confirm Follow Up Transport: Family  The Patient and/or Patient Representative was Provided with a Choice of Provider and Agrees with the Discharge Plan?: Yes  Discharge Location  Discharge Placement: Home        Chart reviewed. NADIA unable to speak with pt. NADIA spoke with pt's daughter Nu Cevallos [phone] ) who confirms pt lives in his own apt on [address]. Pt's income is from [employer], has [employer]. Pt uses [employer] on [address] across the street from pt's apt. NADIA provided daughter info for [facility] & that pt's medications are very affordable & pt should be able to acquire. NADIA placed copy of [facility] card on pt's chart but daughter understands it is not needed to use. Again, pt has [employer] so should also not have an issue going to MD appt.   NADIA called [facility] Rheumatology office & left a message requesting they make appt for pt & we will see it on AVS.

## 2020-09-03 NOTE — PROGRESS NOTES
Problem: Self Care Deficits Care Plan (Adult)  Goal: *Acute Goals and Plan of Care (Insert Text)  Outcome: Progressing Towards Goal  Note: 1. Patient will perform grooming with supervision. 2. Patient will perform Upper body dressing with supervision  3. Patient will perform lower body dressing with CGA  4. Patient will perform upper and lower body bathing with supervision. 5. Patient will perform toilet transfers with CGA/SBA. 6. Patient will perform shower transfer with CGA/SBA  7. Patient will participate in 30 + minutes of ADL/ therapeutic exercise/therapeutic activity with min rest breaks to increase activity tolerance for self care. 8. Patient will perform ADL functional mobility in room with CGA. /SBA    Goals to be achieved in 7 days. OCCUPATIONAL THERAPY: Initial Assessment and PM 9/3/2020  INPATIENT: OT Visit Days: 1  Payor: Trish Hayes / Plan: Channing Rosales / Product Type: Managed Care Medicare /      NAME/AGE/GENDER: Cande Chapa is a 77 y.o. male   PRIMARY DIAGNOSIS:  Myxedema coma ([facility] [address] [E03.5] Myxedema coma ([facility] [address] Myxedema coma ([facility] [address]        ICD-10: Treatment Diagnosis:    Generalized Muscle Weakness (M62.81)  Other lack of cordination (R27.8)  Difficulty in walking, Not elsewhere classified (R26.2)  Other abnormalities of gait and mobility (R26.89)   Precautions/Allergies:     Pcn [penicillins] and Amoxicillin      ASSESSMENT:     Mr. Ben Canada presents supine in bed in ICU. He is SBA supine to sit and CGA with transfer to recliner using his [facility] Hilt. He is grossly min assist with distal LE ADLS. He does not wear socks at home and wears flip flops. He is hard of hearing and has a cataract in L eye that he cant not afford to remove. He does not drive any more. His daughter brings him his groceries. He lives in a ground floor apartment with 3 steps. He is cooperative. He would benefit from skilled OT. Will follow.     This section established at most recent assessment   PROBLEM LIST (Impairments causing functional limitations):  Decreased Strength  Decreased ADL/Functional Activities  Decreased Transfer Abilities  Decreased Ambulation Ability/Technique  Decreased Balance   INTERVENTIONS PLANNED: (Benefits and precautions of occupational therapy have been discussed with the patient.)  Activities of daily living training  Adaptive equipment training  Balance training  Clothing management  Donning&doffing training  Neuromuscular re-eduation  Therapeutic activity  Therapeutic exercise     TREATMENT PLAN: Frequency/Duration: Follow patient 2 times to address above goals. Rehabilitation Potential For Stated Goals: Good     REHAB RECOMMENDATIONS (at time of discharge pending progress):    Placement: It is my opinion, based on this patient's performance to date, that Mr. Russell may benefit from participating in 1-2 additional therapy sessions in order to continue to assess for rehab potential and then make recommendation for disposition at discharge. Equipment:   TBA              OCCUPATIONAL PROFILE AND HISTORY:   History of Present Injury/Illness (Reason for Referral):  See H and P  Past Medical History/Comorbidities:   Mr. Zackary Brantley  has a past medical history of Arthritis, B12 deficiency (10/19/2018), Change in vision, Chronic hip pain (10/20/2016), Deep vein thrombosis (DVT) of proximal lower extremity ([address] (3/21/2019), Edema leg, Endocrine disease, GERD (gastroesophageal reflux disease), High cholesterol, Hypertension, Iron deficiency anemia, Murmur, Muscle weakness, Myalgia, Neurological disorder, Pelvic pain in male, Polymyalgia rheumatica ([address] (10/20/2016), Psychiatric disorder, Pulmonary emboli ([address] (12/21/2018), SDH (subdural hematoma) ([address] and Thyroid disease.   Mr. Zackary Brantley  has a past surgical history that includes hx tonsillectomy and hx other surgical.  Social History/Living Environment:   Home Environment: Apartment  # Steps to Enter: 3  Rails to Enter: No  One/Two Story Residence: One story  Living Alone: Yes  Support Systems: Child(marlon)  Patient Expects to be Discharged to[de-identified] Apartment  Current DME Used/Available at Home: heather Dumont  Prior Level of Function/Work/Activity:  Mod I lives alone, [address], does not drive     Number of Personal Factors/Comorbidities that affect the Plan of Care: Brief history (0):  LOW COMPLEXITY   ASSESSMENT OF OCCUPATIONAL PERFORMANCE[de-identified]   Activities of Daily Living:   Basic ADLs (From Assessment) Complex ADLs (From Assessment)   Feeding: Supervision  Oral Facial Hygiene/Grooming: Supervision  Bathing: Minimum assistance  Upper Body Dressing: Supervision  Lower Body Dressing: Minimum assistance  Toileting: Contact guard assistance     Grooming/Bathing/Dressing Activities of Daily Living     Cognitive Retraining  Safety/Judgement: Awareness of environment; Fall prevention                       Bed/Mat Mobility  Supine to Sit: Stand-by assistance  Sit to Stand: Contact guard assistance  Stand to Sit: Contact guard assistance  Bed to Chair: Contact guard assistance  Scooting: Stand-by assistance     Most Recent Physical Functioning:   Gross Assessment:                  Posture:     Balance:  Sitting: Intact  Standing: With support Bed Mobility:  Supine to Sit: Stand-by assistance  Scooting: Stand-by assistance  Wheelchair Mobility:     Transfers:  Sit to Stand: Contact guard assistance  Stand to Sit: Contact guard assistance  Bed to Chair: Contact guard assistance            Patient Vitals for the past 6 hrs:   BP BP Patient Position SpO2 Pulse   09/03/20 1000 (!) 160/96 -- 95 % 78   09/03/20 1030 (!) 152/95 -- 95 % --   09/03/20 1100 133/88 -- 95 % 64   09/03/20 1126 -- -- 94 % (!) 58   09/03/20 1129 133/88 At rest 95 % 65   09/03/20 1130 109/78 -- 97 % (!) 59   09/03/20 1200 98/66 -- 94 % (!) 56   09/03/20 1230 117/78 -- 95 % (!) 58   09/03/20 1300 131/82 -- 94 % 81   09/03/20 1330 123/79 -- 96 % 64   09/03/20 1400 122/76 -- 94 % 65   09/03/20 1430 121/77 -- 95 % 68   09/03/20 1500 116/84 -- 97 % 69   09/03/20 1513 118/85 At rest 96 % 59       Mental Status  Neurologic State: Alert, Appropriate for age  Orientation Level: Appropriate for age  Cognition: Appropriate decision making, Appropriate for age attention/concentration, Appropriate safety awareness, Follows commands  Perception: Appears intact  Perseveration: No perseveration noted  Safety/Judgement: Awareness of environment, Fall prevention                          Physical Skills Involved:  Balance  Strength  Activity Tolerance Cognitive Skills Affected (resulting in the inability to perform in a timely and safe manner):  none Psychosocial Skills Affected:  none   Number of elements that affect the Plan of Care: 1-3:  LOW COMPLEXITY   CLINICAL DECISION MAKING:   [facility] AM-PAC 6 Clicks   Daily Activity Inpatient Short Form  How much help from another person does the patient currently need. .. Total A Lot A Little None   1. Putting on and taking off regular lower body clothing? [] 1   [] 2   [x] 3   [] 4   2. Bathing (including washing, rinsing, drying)? [] 1   [] 2   [x] 3   [] 4   3. Toileting, which includes using toilet, bedpan or urinal?   [] 1   [] 2   [x] 3   [] 4   4. Putting on and taking off regular upper body clothing? [] 1   [] 2   [] 3   [] 4   5. Taking care of personal grooming such as brushing teeth? [] 1   [] 2   [] 3   [x] 4   6. Eating meals? [] 1   [] 2   [] 3   [x] 4   © 2007, Trustees of [facility], under license to [employer]. All rights reserved      Score:  Initial: 21 Most Recent: X (Date: -- )    Interpretation of Tool:  Represents activities that are increasingly more difficult (i.e. Bed mobility, Transfers, Gait).      Medical Necessity:     · Patient is expected to demonstrate progress in   · Self care skills and functional mobility  ·     Reason for Services/Other Comments:  · Patient continues to require skilled intervention due to   · Above listed deficits     Use of outcome tool(s) and clinical judgement create a POC that gives a: LOW COMPLEXITY         TREATMENT:   (In addition to Assessment/Re-Assessment sessions the following treatments were rendered)     Pre-treatment Symptoms/Complaints:    Pain: Initial:   Pain Intensity 1: 0  Post Session:  0/10     Assessment/Reassessment only, no treatment provided today    Braces/Orthotics/Lines/Etc:   ICU monitors   O2 Device: Room air  Treatment/Session Assessment:    Response to Treatment:  tolerated well  Interdisciplinary Collaboration:   Physical Therapist  Occupational Therapist  Registered Nurse  After treatment position/precautions:   Up in chair  Bed/Chair-wheels locked  Bed in low position  Call light within reach  Nurse at bedside   Compliance with Program/Exercises: Compliant all of the time. Recommendations/Intent for next treatment session: \"Next visit will focus on advancements to more challenging activities and reduction in assistance provided\".   Total Treatment Duration:  OT Patient Time In/Time Out  Time In: 1530  Time Out: 1105 [facility] [place]

## 2020-09-03 NOTE — H&P
HOSPITALIST H&P  NAME:  Jyoti Kate   Age:  77 y.o.  :   1955   MRN:   [id number]  PCP: Tameka Pack NP  Treatment Team: Attending Provider: Merlin Child, MD    Prior     CC: Reason for admission is: myxedema/ severe hypothyroidism    HPI:   Patient history was obtained from the ER provider prior to seeing the patient. Patient is a 77 y.o. male who presents to the ER due to multiple symptoms. His daughter apparently had to almost force him to come in. Most significant is overall weakness, fatigue, imbalance, shortness of breath. Symptoms have been going on for at least a week but many of his symptoms for no longer and have just been more progressive. Denies any fever/chills [although daughter reports he is always cold], NVD, chest pain, leg swelling, abdominal pain, focal weakness or numbness and tingling. There is some question of altered mental status. He is more alert during my exam.  Daughter states she is unsure if he has had confusion, or with his difficulty hearing and severe fatigue, she thinks he may not just understand what is being asked. He admits to not taking medications or seeing his physicians and approximately a year due to financial problems. ER work-up showed him to have severe hypothyroidism and almost borderline myxedema coma. I did assure patient and daughter that thyroid medication is very cheap and can be obtained at [facility] for $10 for 90-day supply. However he cannot afford to go to doctors to get the prescription. ROS:  All systems have been reviewed and are negative except as stated in HPI or elsewhere.       Past Medical History:   Diagnosis Date    Arthritis     B12 deficiency 10/19/2018    Change in vision     Chronic hip pain 10/20/2016    Deep vein thrombosis (DVT) of proximal lower extremity ([facility] [address] 3/21/2019    Edema leg     Endocrine disease     hypothyroidism    GERD (gastroesophageal reflux disease)     well controlled on meds    High cholesterol     Hypertension     Iron deficiency anemia     Murmur     Muscle weakness     Myalgia     Neurological disorder     HA    Pelvic pain in male     Polymyalgia rheumatica ([facility] [address] 10/20/2016    Psychiatric disorder     depression    Pulmonary emboli (HCC) 2018    SDH (subdural hematoma) (HCC)     HA, memory loss    Thyroid disease     hypo. Past Surgical History:   Procedure Laterality Date    HX OTHER SURGICAL      Subdural Hematoma    HX TONSILLECTOMY        Social History     Tobacco Use    Smoking status: Former Smoker     Packs/day: 2.00     Years: 10.00     Pack years: 20.00     Types: Cigarettes     Last attempt to quit: 1999     Years since quittin.6    Smokeless tobacco: Former User    Tobacco comment: Chew, quit 15 years ago. Substance Use Topics    Alcohol use: Yes     Alcohol/week: 11.7 standard drinks     Types: 14 Shots of liquor per week      Family History   Problem Relation Age of Onset    Cancer Mother     Hypertension Father     Heart Attack Father     Heart Attack Brother        FH Reviewed and non-contributory to admitting diagnosis    Allergies   Allergen Reactions    Pcn [Penicillins] Unknown (comments)    Amoxicillin Hives      Prior to Admission Medications   Prescriptions Last Dose Informant Patient Reported? Taking?   acetaminophen (TYLENOL ARTHRITIS PAIN) 650 mg TbER   Yes No   Sig: Take 1,300 mg by mouth two (2) times a day. fexofenadine (ALLEGRA) 180 mg tablet   No No   Sig: Take 1 Tab by mouth daily. Facility-Administered Medications: None         Objective:        Intake/Output Summary (Last 24 hours) at 2020  Last data filed at 2020 1820  Gross per 24 hour   Intake 500 ml   Output    Net 500 ml      Temp (24hrs), Av °F (36.7 °C), Min:98 °F (36.7 °C), Max:98 °F (36.7 °C)    Oxygen Therapy  O2 Sat (%): 95 % (20)  Pulse via Oximetry: 71 beats per minute (20)  O2 Device: Room air (09/02/20 1653)   Body mass index is 23.72 kg/m². Patient Vitals for the past 24 hrs:   Temp Pulse Resp BP SpO2   09/02/20 2059   15 (!) 127/96 95 %   09/02/20 2058  66 15  94 %   09/02/20 2029  (!) 58 11 (!) 130/95 94 %   09/02/20 1959  67 12 (!) 130/93 97 %   09/02/20 1929    (!) 143/97    09/02/20 1920   12  94 %   09/02/20 1910    (!) 154/98 97 %   09/02/20 1907    145/86    09/02/20 1653 98 °F (36.7 °C) 88 16 (!) 139/103 94 %     Physical Exam:    General:    WD and WN, No apparent distress. Head:   Normocephalic, without obvious abnormality, atraumatic. Eyes:  PERRL; EOMI; sclera normal/non-icteric  ENT:  Hearing is significantly diminished. Oropharynx is clear with tacky mucous membranes   Resp:    Clear to auscultation bilaterally. No Wheezing or Rhonchi. Resp are even and unlabored  Heart[de-identified]  Regular rate and rhythm,  no murmur,   No LE edema  Abdomen:   Soft, non-tender. Not distended. Bowel sounds normal.  hepato-splenomegaly cannot be assessed due to obesity     Musc/SK: Muscle strength is fair/good and tone normal; No cyanosis. No clubbing  Skin:     Texture, turgor normal. No significant rashes or lesions.   Capillary refill < 2 sec  Neurologic: CN II - XII are grossly intact - Eye exam as noted above  Psych: Somnolent and oriented x 4;  Judgement and insight are normal     Data Review:   Recent Results (from the past 24 hour(s))   CBC W/O DIFF    Collection Time: 09/02/20  5:48 PM   Result Value Ref Range    WBC 5.3 4.3 - 11.1 K/uL    RBC 3.87 (L) 4.23 - 5.6 M/uL    HGB 13.1 (L) 13.6 - 17.2 g/dL    HCT 38.1 (L) 41.1 - 50.3 %    MCV 98.4 (H) 79.6 - 97.8 FL    MCH 33.9 (H) 26.1 - 32.9 PG    MCHC 34.4 31.4 - 35.0 g/dL    RDW 13.9 11.9 - 14.6 %    PLATELET 606 655 - 651 K/uL    MPV 10.8 9.4 - 12.3 FL    ABSOLUTE NRBC 0.00 0.0 - 0.2 K/uL   METABOLIC PANEL, COMPREHENSIVE    Collection Time: 09/02/20  5:48 PM   Result Value Ref Range    Sodium 138 136 - 145 mmol/L Potassium 3.3 (L) 3.5 - 5.1 mmol/L    Chloride 104 98 - 107 mmol/L    CO2 25 21 - 32 mmol/L    Anion gap 9 7 - 16 mmol/L    Glucose 108 (H) 65 - 100 mg/dL    BUN 20 8 - 23 MG/DL    Creatinine 1.37 0.8 - 1.5 MG/DL    GFR est AA >60 >60 ml/min/1.73m2    GFR est non-AA 55 (L) >60 ml/min/1.73m2    Calcium 9.3 8.3 - 10.4 MG/DL    Bilirubin, total 0.5 0.2 - 1.1 MG/DL    ALT (SGPT) 42 12 - 65 U/L    AST (SGOT) 40 (H) 15 - 37 U/L    Alk. phosphatase 48 (L) 50 - 136 U/L    Protein, total 7.4 6.3 - 8.2 g/dL    Albumin 4.2 3.2 - 4.6 g/dL    Globulin 3.2 2.3 - 3.5 g/dL    A-G Ratio 1.3 1.2 - 3.5     D DIMER    Collection Time: 09/02/20  5:48 PM   Result Value Ref Range    D DIMER 0.43 <0.56 ug/ml(FEU)   TROPONIN-HIGH SENSITIVITY    Collection Time: 09/02/20  5:48 PM   Result Value Ref Range    Troponin-High Sensitivity 7.8 0 - 14 pg/mL   TSH 3RD GENERATION    Collection Time: 09/02/20  5:48 PM   Result Value Ref Range    .000 uIU/mL   NT-PRO BNP    Collection Time: 09/02/20  5:48 PM   Result Value Ref Range    NT pro- (H) 5 - 125 PG/ML   EKG, 12 LEAD, INITIAL    Collection Time: 09/02/20  5:51 PM   Result Value Ref Range    Ventricular Rate 65 BPM    Atrial Rate 65 BPM    P-R Interval 208 ms    QRS Duration 94 ms    Q-T Interval 392 ms    QTC Calculation (Bezet) 407 ms    Calculated P Axis 39 degrees    Calculated R Axis 90 degrees    Calculated T Axis 103 degrees    Diagnosis       !! AGE AND GENDER SPECIFIC ECG ANALYSIS !!   Normal sinus rhythm  Rightward axis  Low voltage QRS  Cannot rule out Anterior infarct , age undetermined  Abnormal ECG  When compared with ECG of 21-DEC-2018 12:37,  Significant changes have occurred     SARS-COV-2    Collection Time: 09/02/20  8:11 PM   Result Value Ref Range    Specimen source Nasopharyngeal      COVID-19 rapid test Not detected NOTD      SARS CoV-2 PENDING      CXR Results  (Last 48 hours)               09/02/20 2118  XR CHEST PORT Final result    Impression:  IMPRESSION: No acute cardiopulmonary abnormality. Narrative:  EXAM: XR CHEST PORT   INDICATION: sob   COMPARISON: Chest x-ray, 2/25/2019       FINDINGS:   The cardiomediastinal silhouette is within normal limits. No focal parenchymal   process. No pleural effusion. No pneumothorax. No acute osseous abnormality. CT Results  (Last 48 hours)               09/02/20 1904  CT HEAD WO CONT Final result    Impression:  IMPRESSION:   No acute intracranial abnormality. Narrative:  EXAMINATION: HEAD CT WITHOUT CONTRAST 9/2/2020 7:04 PM       ACCESSION NUMBER: [id number]       INDICATION: ataxia       COMPARISON: CT head, 3/19/2011       TECHNIQUE: Multiple-row detector helical CT examination of the head without   intravenous contrast.        Radiation dose reduction techniques were used for this study:  Our CT scanners   use one or all of the following: Automated exposure control, adjustment of the   mA and/or kVp according to patient's size, iterative reconstruction. FINDINGS:   No evidence of intracranial mass, hemorrhage, or large territorial infarct. The   ventricles are normal in size and position. The basal cisterns are patent. No   extra-axial fluid collection or mass effect. The orbital contents are within normal limits. The paranasal sinuses are clear. The mastoid air cells and middle ears are clear. No significant osseous or   extracranial soft tissue lesions. Assessment and Plan: Active Hospital Problems    Diagnosis Date Noted    Myxedema coma ([address] 09/02/2020    Hypertension 10/20/2016    Polymyalgia rheumatica ([address] 10/20/2016    Hypothyroidism 02/10/2011     Principal Problem:    Myxedema coma ([address] (9/2/2020)  He was given initial dose of IV steroids in the emergency room. Will order IV thyroxine for tonight and resume oral dose in the morning. It appears he is already improving since his arrival in the emergency room.   He was admitted to the ICU in case of any further deterioration. Case was discussed with intensivist by the ER physician at my request.  Selam Bales felt that he could be appropriately managed at our facility. He is improving and I am in agreement with that at this time. Active Problems:    Hypothyroidism (2/10/2011)    As above      Hypertension (10/20/2016)  I will start him on an ACE inhibitor, as it is effective and affordable. Polymyalgia rheumatica ([facility] [address] (10/20/2016)  We will defer starting any further new medications at this time. Reassess as his thyroid function improves    Case management consult to assist with financial barriers to medications. In actuality his medications would be very affordable. I believe the difficulty is in pain for the office visits and follow-up for the medications. · PLAN General  · DVT prophylaxis:  Lovenox  · Code status: Full;  HCPOA:   · Risk: high  · Anticipated DC needs:  · Estimated LOS:  Greater than 2 midnights  · Plans discussed with patient and/or caregiver; questions answered. Parts of this document were created using dragon voice recognition software.  The chart has been reviewed but errors may still be present    Med records reviewed if applicable; findings:     Critical care time if applicable:  60 min    Signed By: Ellen Reaves MD     September 2, 2020

## 2020-09-03 NOTE — PROGRESS NOTES
Hospitalist Note     Admit Date:  2020  4:54 PM   Name:  Kristyn Dsouza   Age:  77 y.o.  :  1955   MRN:  [id number]   PCP:  Ai Yousif NP  Treatment Team: Attending Provider: Maggie Zhang MD; Primary Nurse: Lluvia Weber, JOSÉ; Utilization Review: Rosy Nava RN; Occupational Therapist: Yanet Ng OT    HPI/Subjective:   Patient is a 77 y.o. male who presents to the ER due to multiple symptoms. His daughter apparently had to almost force him to come in. Most significant is overall weakness, fatigue, imbalance, shortness of breath. Symptoms have been going on for at least a week but many of his symptoms for no longer and have just been more progressive. Denies any fever/chills [although daughter reports he is always cold], NVD, chest pain, leg swelling, abdominal pain, focal weakness or numbness and tingling. There is some question of altered mental status. He is more alert during my exam.  Daughter states she is unsure if he has had confusion, or with his difficulty hearing and severe fatigue, she thinks he may not just understand what is being asked. He admits to not taking medications or seeing his physicians and approximately a year due to financial problems. ER work-up showed him to have severe hypothyroidism and almost borderline myxedema coma. I did assure patient and daughter that thyroid medication is very cheap and can be obtained at [facility] for $10 for 90-day supply. However he cannot afford to go to doctors to get the prescription.      patient is alert awake and answering questions appropriately. States he has not been taking his medications for at least 10 months. No fever no chills. He feels very cold.       Objective:     Patient Vitals for the past 24 hrs:   Temp Pulse Resp BP SpO2   20 1513 98.4 °F (36.9 °C) 64 11 118/85 96 %   20 1500  69 18 116/84 97 %   20 1430  68 22 121/77 95 %   20 1400  65 11 122/76 94 %   09/03/20 1330  64 12 123/79 96 %   09/03/20 1300  81 23 131/82 94 %   09/03/20 1230  (!) 58 11 117/78 95 %   09/03/20 1200  (!) 56 11 98/66 94 %   09/03/20 1130  (!) 59 12 109/78 97 %   09/03/20 1129 98.5 °F (36.9 °C) 65 16 133/88 95 %   09/03/20 1126  (!) 58 13  94 %   09/03/20 1100  64 12 133/88 95 %   09/03/20 1030   11 (!) 152/95 95 %   09/03/20 1000  78 18 (!) 160/96 95 %   09/03/20 0941  70  (!) 145/100    09/03/20 0940  70 12 (!) 145/100 95 %   09/03/20 0800  73 12 (!) 147/100 97 %   09/03/20 0751 98.7 °F (37.1 °C) 76 17 (!) 149/101 98 %   09/03/20 0700  65 10 (!) 146/95 95 %   09/03/20 0630  60 10 135/90 93 %   09/03/20 0600  62 12 126/84 94 %   09/03/20 0530  63 16 112/81 95 %   09/03/20 0500  60 16 (!) 136/93 98 %   09/03/20 0430  (!) 58 12 145/89 96 %   09/03/20 0400  64 13 138/90 98 %   09/03/20 0330 97.3 °F (36.3 °C) 62 10 (!) 146/91 99 %   09/03/20 0300  (!) 58 9 (!) 148/96 97 %   09/03/20 0230  61 11 140/90 96 %   09/03/20 0200  (!) 57 13 (!) 122/92 94 %   09/03/20 0130  (!) 57 12 116/81 94 %   09/03/20 0100  (!) 55 19 111/72 97 %   09/03/20 0030  (!) 57 11 117/80 95 %   09/02/20 2356  61      09/02/20 2330 97.9 °F (36.6 °C) (!) 57 10 101/76 95 %   09/02/20 2300  61 12 128/88 95 %   09/02/20 2230  61 12 (!) 112/96 97 %   09/02/20 2200  61 11 (!) 145/99 97 %   09/02/20 2130 97.1 °F (36.2 °C) 87 23 (!) 152/108 96 %   09/02/20 2059   15 (!) 127/96 95 %   09/02/20 2058  66 15  94 %   09/02/20 2029  (!) 58 11 (!) 130/95 94 %   09/02/20 2000  66      09/02/20 1959  67 12 (!) 130/93 97 %   09/02/20 1929    (!) 143/97    09/02/20 1920   12  94 %   09/02/20 1910    (!) 154/98 97 %   09/02/20 1907    145/86    09/02/20 1653 98 °F (36.7 °C) 88 16 (!) 139/103 94 %     Oxygen Therapy  O2 Sat (%): 96 % (09/03/20 1513)  Pulse via Oximetry: 65 beats per minute (09/03/20 1513)  O2 Device: Room air (09/03/20 1513)      Intake/Output Summary (Last 24 hours) at 9/3/2020 1635  Last data filed at 9/3/2020 1521  Gross per 24 hour   Intake 1595 ml   Output 1800 ml   Net -205 ml       *Note that automatically entered I/Os may not be accurate; dependent on patient compliance with collection and accurate  by techs. General:    Well nourished. Alert. CV:   RRR. No murmur, rub, or gallop. Lungs:   CTAB. No wheezing, rhonchi, or rales. Abdomen:   Soft, nontender, nondistended. Extremities: Warm and dry. No cyanosis or edema. Skin:     No rashes or jaundice.    Neuro:  AAO X3, No gross focal deficits    Data Review:  I have reviewed all labs, meds, and studies from the last 24 hours:    Recent Results (from the past 24 hour(s))   T4, FREE    Collection Time: 09/02/20  5:47 PM   Result Value Ref Range    T4, Free 0.3 (L) 0.9 - 1.8 NG/DL   T3 TOTAL    Collection Time: 09/02/20  5:47 PM   Result Value Ref Range    T3, total 0.02 (L) 0.60 - 1.81 ng/mL   CORTISOL, PM    Collection Time: 09/02/20  5:47 PM   Result Value Ref Range    Cortisol, p.m. 12.2 2 - 14 ug/dL   CBC W/O DIFF    Collection Time: 09/02/20  5:48 PM   Result Value Ref Range    WBC 5.3 4.3 - 11.1 K/uL    RBC 3.87 (L) 4.23 - 5.6 M/uL    HGB 13.1 (L) 13.6 - 17.2 g/dL    HCT 38.1 (L) 41.1 - 50.3 %    MCV 98.4 (H) 79.6 - 97.8 FL    MCH 33.9 (H) 26.1 - 32.9 PG    MCHC 34.4 31.4 - 35.0 g/dL    RDW 13.9 11.9 - 14.6 %    PLATELET 961 619 - 489 K/uL    MPV 10.8 9.4 - 12.3 FL    ABSOLUTE NRBC 0.00 0.0 - 0.2 K/uL   METABOLIC PANEL, COMPREHENSIVE    Collection Time: 09/02/20  5:48 PM   Result Value Ref Range    Sodium 138 136 - 145 mmol/L    Potassium 3.3 (L) 3.5 - 5.1 mmol/L    Chloride 104 98 - 107 mmol/L    CO2 25 21 - 32 mmol/L    Anion gap 9 7 - 16 mmol/L    Glucose 108 (H) 65 - 100 mg/dL    BUN 20 8 - 23 MG/DL    Creatinine 1.37 0.8 - 1.5 MG/DL    GFR est AA >60 >60 ml/min/1.73m2    GFR est non-AA 55 (L) >60 ml/min/1.73m2    Calcium 9.3 8.3 - 10.4 MG/DL    Bilirubin, total 0.5 0.2 - 1.1 MG/DL    ALT (SGPT) 42 12 - 65 U/L    AST (SGOT) 40 (H) 15 - 37 U/L    Alk. phosphatase 48 (L) 50 - 136 U/L    Protein, total 7.4 6.3 - 8.2 g/dL    Albumin 4.2 3.2 - 4.6 g/dL    Globulin 3.2 2.3 - 3.5 g/dL    A-G Ratio 1.3 1.2 - 3.5     D DIMER    Collection Time: 09/02/20  5:48 PM   Result Value Ref Range    D DIMER 0.43 <0.56 ug/ml(FEU)   TROPONIN-HIGH SENSITIVITY    Collection Time: 09/02/20  5:48 PM   Result Value Ref Range    Troponin-High Sensitivity 7.8 0 - 14 pg/mL   TSH 3RD GENERATION    Collection Time: 09/02/20  5:48 PM   Result Value Ref Range    .000 uIU/mL   NT-PRO BNP    Collection Time: 09/02/20  5:48 PM   Result Value Ref Range    NT pro- (H) 5 - 125 PG/ML   EKG, 12 LEAD, INITIAL    Collection Time: 09/02/20  5:51 PM   Result Value Ref Range    Ventricular Rate 65 BPM    Atrial Rate 65 BPM    P-R Interval 208 ms    QRS Duration 94 ms    Q-T Interval 392 ms    QTC Calculation (Bezet) 407 ms    Calculated P Axis 39 degrees    Calculated R Axis 90 degrees    Calculated T Axis 103 degrees    Diagnosis       !! AGE AND GENDER SPECIFIC ECG ANALYSIS !!   Normal sinus rhythm  Rightward axis  Low voltage QRS  Cannot rule out Anterior infarct , age undetermined  Abnormal ECG  When compared with ECG of 21-DEC-2018 12:37,  rate has slowed and voltage decreased  Confirmed by TIFFANIE HECTOR (), Cesar Gonzalez ([id number]) on 9/3/2020 6:04:25 AM     SARS-COV-2    Collection Time: 09/02/20  8:11 PM   Result Value Ref Range    Specimen source Nasopharyngeal      COVID-19 rapid test Not detected NOTD      SARS CoV-2 PENDING    METABOLIC PANEL, BASIC    Collection Time: 09/03/20  4:20 AM   Result Value Ref Range    Sodium 138 136 - 145 mmol/L    Potassium 3.9 3.5 - 5.1 mmol/L    Chloride 105 98 - 107 mmol/L    CO2 26 21 - 32 mmol/L    Anion gap 7 7 - 16 mmol/L    Glucose 126 (H) 65 - 100 mg/dL    BUN 17 8 - 23 MG/DL    Creatinine 1.35 0.8 - 1.5 MG/DL    GFR est AA >60 >60 ml/min/1.73m2    GFR est non-AA 56 (L) >60 ml/min/1.73m2 Calcium 9.3 8.3 - 10.4 MG/DL   MAGNESIUM    Collection Time: 09/03/20  4:20 AM   Result Value Ref Range    Magnesium 2.8 (H) 1.8 - 2.4 mg/dL   CBC WITH AUTOMATED DIFF    Collection Time: 09/03/20  4:20 AM   Result Value Ref Range    WBC 6.1 4.3 - 11.1 K/uL    RBC 4.08 (L) 4.23 - 5.6 M/uL    HGB 13.4 (L) 13.6 - 17.2 g/dL    HCT 40.3 (L) 41.1 - 50.3 %    MCV 98.8 (H) 79.6 - 97.8 FL    MCH 32.8 26.1 - 32.9 PG    MCHC 33.3 31.4 - 35.0 g/dL    RDW 13.8 11.9 - 14.6 %    PLATELET 443 621 - 477 K/uL    MPV 11.0 9.4 - 12.3 FL    ABSOLUTE NRBC 0.00 0.0 - 0.2 K/uL    DF AUTOMATED      NEUTROPHILS 86 (H) 43 - 78 %    LYMPHOCYTES 10 (L) 13 - 44 %    MONOCYTES 3 (L) 4.0 - 12.0 %    EOSINOPHILS 1 0.5 - 7.8 %    BASOPHILS 0 0.0 - 2.0 %    IMMATURE GRANULOCYTES 0 0.0 - 5.0 %    ABS. NEUTROPHILS 5.3 1.7 - 8.2 K/UL    ABS. LYMPHOCYTES 0.6 0.5 - 4.6 K/UL    ABS. MONOCYTES 0.2 0.1 - 1.3 K/UL    ABS. EOSINOPHILS 0.0 0.0 - 0.8 K/UL    ABS. BASOPHILS 0.0 0.0 - 0.2 K/UL    ABS. IMM. GRANS. 0.0 0.0 - 0.5 K/UL        All Micro Results     None          No results found for this visit on 09/02/20.     Current Meds:  Current Facility-Administered Medications   Medication Dose Route Frequency    levothyroxine (SYNTHROID) tablet 100 mcg  100 mcg Oral ACB    amLODIPine (NORVASC) tablet 5 mg  5 mg Oral DAILY    0.9% sodium chloride infusion  75 mL/hr IntraVENous CONTINUOUS    sodium chloride (NS) flush 5-40 mL  5-40 mL IntraVENous Q8H    sodium chloride (NS) flush 5-40 mL  5-40 mL IntraVENous PRN    acetaminophen (TYLENOL) tablet 650 mg  650 mg Oral Q6H PRN    Or    acetaminophen (TYLENOL) suppository 650 mg  650 mg Rectal Q6H PRN    promethazine (PHENERGAN) tablet 12.5 mg  12.5 mg Oral Q6H PRN    Or    ondansetron (ZOFRAN) injection 4 mg  4 mg IntraVENous Q6H PRN    famotidine (PEPCID) tablet 20 mg  20 mg Oral BID    enoxaparin (LOVENOX) injection 40 mg  40 mg SubCUTAneous DAILY    senna (SENOKOT) tablet 8.6 mg  1 Tab Oral DAILY PRN Other Studies (last 24 hours):  Ct Head Wo Cont    Result Date: 9/2/2020  EXAMINATION: HEAD CT WITHOUT CONTRAST 9/2/2020 7:04 PM ACCESSION NUMBER: [id number] INDICATION: ataxia COMPARISON: CT head, 3/19/2011 TECHNIQUE: Multiple-row detector helical CT examination of the head without intravenous contrast. Radiation dose reduction techniques were used for this study:  Our CT scanners use one or all of the following: Automated exposure control, adjustment of the mA and/or kVp according to patient's size, iterative reconstruction. FINDINGS: No evidence of intracranial mass, hemorrhage, or large territorial infarct. The ventricles are normal in size and position. The basal cisterns are patent. No extra-axial fluid collection or mass effect. The orbital contents are within normal limits. The paranasal sinuses are clear. The mastoid air cells and middle ears are clear. No significant osseous or extracranial soft tissue lesions. IMPRESSION: No acute intracranial abnormality. Xr Chest Port    Result Date: 9/2/2020  EXAM: XR CHEST PORT INDICATION: sob COMPARISON: Chest x-ray, 2/25/2019 FINDINGS: The cardiomediastinal silhouette is within normal limits. No focal parenchymal process. No pleural effusion. No pneumothorax. No acute osseous abnormality. IMPRESSION: No acute cardiopulmonary abnormality.        Assessment and Plan:     Hospital Problems as of 9/3/2020 Date Reviewed: 8/29/2019          Codes Class Noted - Resolved POA    DEL (acute kidney injury) ([facility] ICD-10-CM: N17.9  ICD-9-CM: 584.9  9/3/2020 - Present Unknown        * (Principal) Myxedema coma ([facility] ICD-10-CM: E03.5  ICD-9-CM: 780.01, 244.9  9/2/2020 - Present Yes        Hypertension ICD-10-CM: I10  ICD-9-CM: 401.9  10/20/2016 - Present Yes        Polymyalgia rheumatica ([facility] (Chronic) ICD-10-CM: M35.3  ICD-9-CM: 113  10/20/2016 - Present Yes        Hypothyroidism ICD-10-CM: E03.9  ICD-9-CM: 244.9  2/10/2011 - Present Yes Plan:    This is a 55-year-old male with    Severe hypothyroidism: Secondary to medication noncompliance  No signs of myxedema coma. TSH on admission is 156. Low free T4 and total T3. Continue levothyroxine. Cortisol level within normal limits. Acute kidney injury POA  Likely pre-renal  Creatinine of 1.37 on admission. 1.35 this am.   Continue hydration with IV fluids. Recheck BMP in a.m. Avoid nephrotoxic medications. Hypertension  Amlodipine started. Polymyalgia rheumatica    DC planning/Dispo: Will await PT OT eval.  Likely patient may need home health PT.     Diet:  DIET REGULAR  DVT ppx: Lovenox    Signed:  Simon Bazan MD

## 2020-09-03 NOTE — PROGRESS NOTES
Patient assessed. Financial needs discussed with patient regarding ability to obtain prescriptions. Will speak with Case Management about plan. No other needs verbalized by patient. Call light within reach. Instructed to call with needs.

## 2020-09-03 NOTE — PROGRESS NOTES
TRANSFER - IN REPORT:    Verbal report received from [facility] RN(name) on [employer]  being received from ER(unit) for routine progression of care      Report consisted of patients Situation, Background, Assessment and   Recommendations(SBAR). Information from the following report(s) SBAR, Kardex, ED Summary, MAR, Med Rec Status and Cardiac Rhythm NSR was reviewed with the receiving nurse. Opportunity for questions and clarification was provided. Assessment completed upon patients arrival to unit and care assumed.

## 2020-09-04 VITALS
TEMPERATURE: 97.6 F | DIASTOLIC BLOOD PRESSURE: 89 MMHG | WEIGHT: 226.19 LBS | OXYGEN SATURATION: 97 % | HEIGHT: 77 IN | HEART RATE: 85 BPM | SYSTOLIC BLOOD PRESSURE: 139 MMHG | RESPIRATION RATE: 17 BRPM | BODY MASS INDEX: 26.71 KG/M2

## 2020-09-04 LAB
ANION GAP SERPL CALC-SCNC: 7 MMOL/L (ref 7–16)
BASOPHILS # BLD: 0 K/UL (ref 0–0.2)
BASOPHILS NFR BLD: 1 % (ref 0–2)
BUN SERPL-MCNC: 16 MG/DL (ref 8–23)
CALCIUM SERPL-MCNC: 8.4 MG/DL (ref 8.3–10.4)
CHLORIDE SERPL-SCNC: 106 MMOL/L (ref 98–107)
CO2 SERPL-SCNC: 25 MMOL/L (ref 21–32)
CREAT SERPL-MCNC: 1.38 MG/DL (ref 0.8–1.5)
DIFFERENTIAL METHOD BLD: ABNORMAL
EOSINOPHIL # BLD: 0.1 K/UL (ref 0–0.8)
EOSINOPHIL NFR BLD: 3 % (ref 0.5–7.8)
ERYTHROCYTE [DISTWIDTH] IN BLOOD BY AUTOMATED COUNT: 13.9 % (ref 11.9–14.6)
GLUCOSE SERPL-MCNC: 95 MG/DL (ref 65–100)
HCT VFR BLD AUTO: 32.5 % (ref 41.1–50.3)
HGB BLD-MCNC: 10.9 G/DL (ref 13.6–17.2)
IMM GRANULOCYTES # BLD AUTO: 0 K/UL (ref 0–0.5)
IMM GRANULOCYTES NFR BLD AUTO: 0 % (ref 0–5)
LYMPHOCYTES # BLD: 1 K/UL (ref 0.5–4.6)
LYMPHOCYTES NFR BLD: 25 % (ref 13–44)
MCH RBC QN AUTO: 33.3 PG (ref 26.1–32.9)
MCHC RBC AUTO-ENTMCNC: 33.5 G/DL (ref 31.4–35)
MCV RBC AUTO: 99.4 FL (ref 79.6–97.8)
MONOCYTES # BLD: 0.4 K/UL (ref 0.1–1.3)
MONOCYTES NFR BLD: 10 % (ref 4–12)
NEUTS SEG # BLD: 2.4 K/UL (ref 1.7–8.2)
NEUTS SEG NFR BLD: 61 % (ref 43–78)
NRBC # BLD: 0 K/UL (ref 0–0.2)
PLATELET # BLD AUTO: 161 K/UL (ref 150–450)
PMV BLD AUTO: 10.8 FL (ref 9.4–12.3)
POTASSIUM SERPL-SCNC: 3.1 MMOL/L (ref 3.5–5.1)
RBC # BLD AUTO: 3.27 M/UL (ref 4.23–5.6)
SODIUM SERPL-SCNC: 138 MMOL/L (ref 136–145)
WBC # BLD AUTO: 3.9 K/UL (ref 4.3–11.1)

## 2020-09-04 PROCEDURE — 74011250636 HC RX REV CODE- 250/636: Performed by: FAMILY MEDICINE

## 2020-09-04 PROCEDURE — 74011250637 HC RX REV CODE- 250/637: Performed by: HOSPITALIST

## 2020-09-04 PROCEDURE — 80048 BASIC METABOLIC PNL TOTAL CA: CPT

## 2020-09-04 PROCEDURE — 74011250637 HC RX REV CODE- 250/637: Performed by: FAMILY MEDICINE

## 2020-09-04 PROCEDURE — 85025 COMPLETE CBC W/AUTO DIFF WBC: CPT

## 2020-09-04 PROCEDURE — 36415 COLL VENOUS BLD VENIPUNCTURE: CPT

## 2020-09-04 RX ORDER — AMLODIPINE BESYLATE 2.5 MG/1
2.5 TABLET ORAL DAILY
Qty: 30 TAB | Refills: 0 | Status: SHIPPED | OUTPATIENT
Start: 2020-09-04 | End: 2020-10-04

## 2020-09-04 RX ORDER — LEVOTHYROXINE SODIUM 100 UG/1
100 TABLET ORAL
Qty: 30 TAB | Refills: 2 | Status: SHIPPED | OUTPATIENT
Start: 2020-09-05 | End: 2020-10-16 | Stop reason: ALTCHOICE

## 2020-09-04 RX ORDER — POTASSIUM CHLORIDE 20 MEQ/1
40 TABLET, EXTENDED RELEASE ORAL
Status: COMPLETED | OUTPATIENT
Start: 2020-09-04 | End: 2020-09-04

## 2020-09-04 RX ORDER — HYDRALAZINE HYDROCHLORIDE 20 MG/ML
10 INJECTION INTRAMUSCULAR; INTRAVENOUS
Status: DISCONTINUED | OUTPATIENT
Start: 2020-09-04 | End: 2020-09-04 | Stop reason: HOSPADM

## 2020-09-04 RX ADMIN — ENOXAPARIN SODIUM 40 MG: 40 INJECTION SUBCUTANEOUS at 05:41

## 2020-09-04 RX ADMIN — Medication 10 ML: at 05:41

## 2020-09-04 RX ADMIN — POTASSIUM CHLORIDE 40 MEQ: 1500 TABLET, EXTENDED RELEASE ORAL at 08:11

## 2020-09-04 RX ADMIN — FAMOTIDINE 20 MG: 20 TABLET, FILM COATED ORAL at 08:11

## 2020-09-04 RX ADMIN — LEVOTHYROXINE SODIUM 100 MCG: 0.1 TABLET ORAL at 08:11

## 2020-09-04 NOTE — PROGRESS NOTES
Bedside and Verbal shift change report to be given to Radha Mendez RN (oncoming nurse) by self [place] [facility] [place] Report included the following information SBAR, Kardex, MAR and Recent Results.

## 2020-09-04 NOTE — PROGRESS NOTES
Problem: Falls - Risk of  Goal: *Absence of Falls  Description: Document Andres Snyder Fall Risk and appropriate interventions in the flowsheet.   9/4/2020 1135 by Sonia Barakat RN  Outcome: Resolved/Met  Note: Fall Risk Interventions:  Mobility Interventions: Assess mobility with egress test, Communicate number of staff needed for ambulation/transfer, Patient to call before getting OOB         Medication Interventions: Patient to call before getting OOB, Teach patient to arise slowly    Elimination Interventions: Call light in reach, Patient to call for help with toileting needs, Urinal in reach    History of Falls Interventions: Bed/chair exit alarm, Room close to nurse's station, Door open when patient unattended      9/4/2020 0940 by Sonia Barakat RN  Outcome: Progressing Towards Goal  Note: Fall Risk Interventions:  Mobility Interventions: Assess mobility with egress test, Communicate number of staff needed for ambulation/transfer, Patient to call before getting OOB         Medication Interventions: Patient to call before getting OOB, Teach patient to arise slowly    Elimination Interventions: Call light in reach, Patient to call for help with toileting needs, Urinal in reach    History of Falls Interventions: Bed/chair exit alarm, Room close to nurse's station, Door open when patient unattended         Problem: Patient Education: Go to Patient Education Activity  Goal: Patient/Family Education  9/4/2020 1135 by Sonia Barakat RN  Outcome: Resolved/Met  9/4/2020 0940 by Sonia Barakat RN  Outcome: Progressing Towards Goal     Problem: Patient Education: Go to Patient Education Activity  Goal: Patient/Family Education  9/4/2020 1135 by Sonia Barakat RN  Outcome: Resolved/Met  9/4/2020 0940 by Sonia Barakat RN  Outcome: Progressing Towards Goal     Problem: Patient Education: Go to Patient Education Activity  Goal: Patient/Family Education  9/4/2020 1135 by Sonia Barakat RN  Outcome: Resolved/Met  9/4/2020 0940 by Lexi Guillen, RN  Outcome: Progressing Towards Goal

## 2020-09-04 NOTE — PROGRESS NOTES
Physical therapy - spoke with patient as he was in the process of getting discharged. He was very eager to go home. He refused getting up and moving around to make sure he felt good. He said he has been up and feels fine.

## 2020-09-04 NOTE — DISCHARGE SUMMARY
Hospitalist Discharge Summary     Admit Date:  2020  4:54 PM   Name:  Jyoti Kate   Age:  77 y.o.  :  1955   MRN:  [id number]   PCP:  Tameka Pack NP  Treatment Team: Attending Provider: Burgess Yue MD; Utilization Review: Ronn Leyva RN; Physical Therapist: Sonia Bragg PT; Primary Nurse: Pedro Colby RN    Problem List for this Hospitalization:  Hospital Problems as of 2020 Date Reviewed: 2019          Codes Class Noted - Resolved POA    EDL (acute kidney injury) ([facility] ICD-10-CM: N17.9  ICD-9-CM: 584.9  9/3/2020 - Present Unknown        * (Principal) Myxedema coma ([facility] ICD-10-CM: E03.5  ICD-9-CM: 780.01, 244.9  2020 - Present Yes        Hypertension ICD-10-CM: I10  ICD-9-CM: 401.9  10/20/2016 - Present Yes        Polymyalgia rheumatica ([facility] (Chronic) ICD-10-CM: M35.3  ICD-9-CM: 170  10/20/2016 - Present Yes        Hypothyroidism ICD-10-CM: E03.9  ICD-9-CM: 244.9  2/10/2011 - Present Yes                Admission HPI from 2020:    Patient is [phone] y. o. male who presents to the ER due to multiple symptoms.  His daughter apparently had to almost force him to come in. [facility] significant is overall weakness, fatigue, imbalance, shortness of breath.  Symptoms have been going on for at least a week but many of his symptoms for no longer and have just been more progressive.  Denies any fever/chills [although daughter reports he is always cold], NVD, chest pain, leg swelling, abdominal pain, focal weakness or numbness and tingling. Aurelia Findalthae is some question of altered mental status.  He is more alert during my exam. Neto Rowland states she is unsure if he has had confusion, or with his difficulty hearing and severe fatigue, she thinks he may not just understand what is being asked. [facility] admits to not taking medications or seeing his physicians and approximately a year due to financial problems.  ER work-up showed him to have severe hypothyroidism and almost borderline myxedema coma.  I did assure patient and daughter that thyroid medication is very cheap and can be obtained at [place] for $10 for 90-day supply.  However he cannot afford to go to doctors to get the prescription. Hospital Course:    Severe hypothyroidism present on admission, myxedema coma ruled out  Acute kidney injury/CKD stage III POA needs outpatient follow-up  Hypertension  Polymyalgia rheumatica    Patient is a 47-year-old male admitted for severe hypothyroidism. TSH on admission was 156. Free T4 and total T3 were low. Patient was initially on IV levothyroxine and subsequently switched to levothyroxine p.o. He also has mildly elevated creatinine could be acute kidney injury or chronic kidney disease stage III. He received IV fluids. Creatinine on discharge was 1.38. On admission it was 1.35. He needs outpatient BMP at PCP office visit in 3 to 5 days. He also has hypertension for which previously was on lisinopril. But because of mildly elevated creatinine amlodipine was started at 5 mg. Patient's blood pressure dropped to 90 systolic last night. Therefore amlodipine dose was decreased to 2.5 mg and a prescription was given. He was also given prescription for levothyroxine. Medication compliance was discussed with the patient in detail. He was scheduled to follow-up with primary care physician in 3 to 5 days. He is discharged home with home health in a stable condition today. Disposition: Home Health Care Svc  Activity: as tolerated  Diet: DIET REGULAR Vegetarian    Follow up instructions, discharge meds at bottom of this note. Plan was discussed with the pt. All questions answered. Patient was stable at time of discharge. Patient will call a physician or return if any concerns.     Diagnostic Imaging/Tests:   Ct Head Wo Cont    Result Date: 9/2/2020  EXAMINATION: HEAD CT WITHOUT CONTRAST 9/2/2020 7:04 PM ACCESSION NUMBER: [id number] INDICATION: ataxia COMPARISON: CT head, 3/19/2011 TECHNIQUE: Multiple-row detector helical CT examination of the head without intravenous contrast. Radiation dose reduction techniques were used for this study:  Our CT scanners use one or all of the following: Automated exposure control, adjustment of the mA and/or kVp according to patient's size, iterative reconstruction. FINDINGS: No evidence of intracranial mass, hemorrhage, or large territorial infarct. The ventricles are normal in size and position. The basal cisterns are patent. No extra-axial fluid collection or mass effect. The orbital contents are within normal limits. The paranasal sinuses are clear. The mastoid air cells and middle ears are clear. No significant osseous or extracranial soft tissue lesions. IMPRESSION: No acute intracranial abnormality. Xr Chest Port    Result Date: 9/2/2020  EXAM: XR CHEST PORT INDICATION: sob COMPARISON: Chest x-ray, 2/25/2019 FINDINGS: The cardiomediastinal silhouette is within normal limits. No focal parenchymal process. No pleural effusion. No pneumothorax. No acute osseous abnormality. IMPRESSION: No acute cardiopulmonary abnormality. Echocardiogram results:  No results found for this visit on 09/02/20.     Procedures done this admission:  * No surgery found *    All Micro Results     None          Labs: Results:       BMP, Mg, Phos Recent Labs     09/04/20  0332 09/03/20  0420 09/02/20  1748    138 138   K 3.1* 3.9 3.3*    105 104   CO2 25 26 25   AGAP 7 7 9   BUN 16 17 20   CREA 1.38 1.35 1.37   CA 8.4 9.3 9.3   GLU 95 126* 108*   MG  --  2.8*  --       CBC Recent Labs     09/04/20  0332 09/03/20  0420 09/02/20  1748   WBC 3.9* 6.1 5.3   RBC 3.27* 4.08* 3.87*   HGB 10.9* 13.4* 13.1*   HCT 32.5* 40.3* 38.1*    191 177   GRANS 61 86*  --    LYMPH 25 10*  --    EOS 3 1  --    MONOS 10 3*  --    BASOS 1 0  --    IG 0 0  --    ANEU 2.4 5.3  --    ABL 1.0 0.6  --    MANISHA 0.1 0.0  --    ABM 0.4 0.2  --    ABB 0.0 0.0  --    AIG 0.0 0.0  --       LFT Recent Labs     09/02/20  1748   ALT 42   AP 48*   TP 7.4   ALB 4.2   GLOB 3.2   AGRAT 1.3      Cardiac Testing Lab Results   Component Value Date/Time    BNP 53 12/21/2018 12:53 PM    B-type Natriuretic Peptide 25.1 05/01/2019 08:27 AM      Coagulation Tests Lab Results   Component Value Date/Time    aPTT 61.4 (H) 12/22/2018 01:20 PM    aPTT 105.2 (H) 12/22/2018 06:17 AM    aPTT 68.6 (H) 12/21/2018 10:59 PM      A1c No results found for: HBA1C, HGBE8, CIO0GFFQ   Lipid Panel Lab Results   Component Value Date/Time    Cholesterol, total 194 05/01/2019 08:27 AM    HDL Cholesterol 80 05/01/2019 08:27 AM    LDL, calculated 93 05/01/2019 08:27 AM    VLDL, calculated 21 05/01/2019 08:27 AM    Triglyceride 107 05/01/2019 08:27 AM      Thyroid Panel Lab Results   Component Value Date/Time    .000 09/02/2020 05:48 PM    TSH 1.310 07/24/2019 08:35 AM    T4, Free 0.3 (L) 09/02/2020 05:47 PM    T4, Free 1.1 12/21/2018 12:53 PM    T3, total 0.02 (L) 09/02/2020 05:47 PM        Most Recent UA Lab Results   Component Value Date/Time    Color Yellow 05/01/2019 08:27 AM    Appearance Clear 05/01/2019 08:27 AM    pH (UA) 5.5 05/01/2019 08:27 AM    Ketone Negative 05/01/2019 08:27 AM    Bilirubin Negative 05/01/2019 08:27 AM    Blood Negative 05/01/2019 08:27 AM    Nitrites Negative 05/01/2019 08:27 AM    Leukocyte Esterase Negative 05/01/2019 08:27 AM        Allergies   Allergen Reactions    Pcn [Penicillins] Unknown (comments)    Amoxicillin Hives     Immunization History   Administered Date(s) Administered    Tdap 06/06/2017       All Labs from Last 24 Hrs:  Recent Results (from the past 24 hour(s))   METABOLIC PANEL, BASIC    Collection Time: 09/04/20  3:32 AM   Result Value Ref Range    Sodium 138 136 - 145 mmol/L    Potassium 3.1 (L) 3.5 - 5.1 mmol/L    Chloride 106 98 - 107 mmol/L    CO2 25 21 - 32 mmol/L    Anion gap 7 7 - 16 mmol/L    Glucose 95 65 - 100 mg/dL    BUN 16 8 - 23 MG/DL Creatinine 1.38 0.8 - 1.5 MG/DL    GFR est AA >60 >60 ml/min/1.73m2    GFR est non-AA 55 (L) >60 ml/min/1.73m2    Calcium 8.4 8.3 - 10.4 MG/DL   CBC WITH AUTOMATED DIFF    Collection Time: 09/04/20  3:32 AM   Result Value Ref Range    WBC 3.9 (L) 4.3 - 11.1 K/uL    RBC 3.27 (L) 4.23 - 5.6 M/uL    HGB 10.9 (L) 13.6 - 17.2 g/dL    HCT 32.5 (L) 41.1 - 50.3 %    MCV 99.4 (H) 79.6 - 97.8 FL    MCH 33.3 (H) 26.1 - 32.9 PG    MCHC 33.5 31.4 - 35.0 g/dL    RDW 13.9 11.9 - 14.6 %    PLATELET 088 170 - 586 K/uL    MPV 10.8 9.4 - 12.3 FL    ABSOLUTE NRBC 0.00 0.0 - 0.2 K/uL    DF AUTOMATED      NEUTROPHILS 61 43 - 78 %    LYMPHOCYTES 25 13 - 44 %    MONOCYTES 10 4.0 - 12.0 %    EOSINOPHILS 3 0.5 - 7.8 %    BASOPHILS 1 0.0 - 2.0 %    IMMATURE GRANULOCYTES 0 0.0 - 5.0 %    ABS. NEUTROPHILS 2.4 1.7 - 8.2 K/UL    ABS. LYMPHOCYTES 1.0 0.5 - 4.6 K/UL    ABS. MONOCYTES 0.4 0.1 - 1.3 K/UL    ABS. EOSINOPHILS 0.1 0.0 - 0.8 K/UL    ABS. BASOPHILS 0.0 0.0 - 0.2 K/UL    ABS. IMM.  GRANS. 0.0 0.0 - 0.5 K/UL       Current Med List in Hospital:   Current Facility-Administered Medications   Medication Dose Route Frequency    hydrALAZINE (APRESOLINE) 20 mg/mL injection 10 mg  10 mg IntraVENous Q6H PRN    levothyroxine (SYNTHROID) tablet 100 mcg  100 mcg Oral ACB    sodium chloride (NS) flush 5-40 mL  5-40 mL IntraVENous Q8H    sodium chloride (NS) flush 5-40 mL  5-40 mL IntraVENous PRN    acetaminophen (TYLENOL) tablet 650 mg  650 mg Oral Q6H PRN    Or    acetaminophen (TYLENOL) suppository 650 mg  650 mg Rectal Q6H PRN    promethazine (PHENERGAN) tablet 12.5 mg  12.5 mg Oral Q6H PRN    Or    ondansetron (ZOFRAN) injection 4 mg  4 mg IntraVENous Q6H PRN    famotidine (PEPCID) tablet 20 mg  20 mg Oral BID    enoxaparin (LOVENOX) injection 40 mg  40 mg SubCUTAneous DAILY    senna (SENOKOT) tablet 8.6 mg  1 Tab Oral DAILY PRN       Discharge Exam:  Patient Vitals for the past 24 hrs:   Temp Pulse Resp BP SpO2   09/04/20 0713 97.5 °F (36.4 °C) 95 16 118/83 96 %   09/04/20 0407  (!) 54  101/66 99 %   09/04/20 0339    101/67    09/04/20 0241 98.1 °F (36.7 °C) 62 18 91/58 98 %   09/04/20 0240    (!) 87/58    09/03/20 2344 98.6 °F (37 °C) 75 18 98/52    09/03/20 2034 97.5 °F (36.4 °C) 68 14 110/69    09/03/20 1708  76 12  97 %   09/03/20 1700  96 12 112/74 94 %   09/03/20 1630  63 13 112/81 96 %   09/03/20 1600  65      09/03/20 1530  68 16 111/78 99 %   09/03/20 1513 98.4 °F (36.9 °C) 64 11 118/85 96 %   09/03/20 1500  69 18 116/84 97 %   09/03/20 1430  68 22 121/77 95 %   09/03/20 1400  65 11 122/76 94 %   09/03/20 1330  64 12 123/79 96 %   09/03/20 1300  81 23 131/82 94 %   09/03/20 1230  (!) 58 11 117/78 95 %   09/03/20 1200  (!) 56 11 98/66 94 %   09/03/20 1130  (!) 59 12 109/78 97 %   09/03/20 1129 98.5 °F (36.9 °C) 65 16 133/88 95 %   09/03/20 1126  (!) 58 13  94 %     Oxygen Therapy  O2 Sat (%): 96 % (09/04/20 0713)  Pulse via Oximetry: 73 beats per minute (09/03/20 1708)  O2 Device: Room air (09/04/20 0705)    Intake/Output Summary (Last 24 hours) at 9/4/2020 1114  Last data filed at 9/4/2020 1101  Gross per 24 hour   Intake 1095 ml   Output 1450 ml   Net -355 ml       *Note that automatically entered I/Os may not be accurate; dependent on patient compliance with collection and accurate  by assistants. General:    Well nourished. Alert. Eyes:   Normal sclerae. Extraocular movements intact. ENT:  Normocephalic, atraumatic. Moist mucous membranes  CV:   Regular rate and rhythm. No murmur, rub, or gallop. Lungs:  Clear to auscultation bilaterally. No wheezing, rhonchi, or rales. Abdomen: Soft, nontender, nondistended. Bowel sounds normal.   Extremities: Warm and dry. No cyanosis or edema. Neurologic: CN II-XII grossly intact. Sensation intact. Skin:     No rashes or jaundice. Psych:  Normal mood and affect.       Discharge Info:   Current Discharge Medication List      START taking these medications    Details   levothyroxine (SYNTHROID) 100 mcg tablet Take 1 Tab by mouth Daily (before breakfast) for 90 days. Qty: 30 Tab, Refills: 2      amLODIPine (NORVASC) 2.5 mg tablet Take 1 Tab by mouth daily for 30 days. Qty: 30 Tab, Refills: 0         CONTINUE these medications which have NOT CHANGED    Details   acetaminophen (TYLENOL ARTHRITIS PAIN) 650 mg TbER Take 1,300 mg by mouth two (2) times a day. fexofenadine (ALLEGRA) 180 mg tablet Take 1 Tab by mouth daily. Qty: 30 Tab, Refills: 12    Associated Diagnoses: ETD (Eustachian tube dysfunction), bilateral             Follow Up Orders: Follow-up Appointments   Procedures    FOLLOW UP VISIT Appointment in: 3 - 5 Days F/u with PCP in 3-5 days     F/u with PCP in 3-5 days     Standing Status:   Standing     Number of Occurrences:   1     Order Specific Question:   Appointment in     Answer:   3 - 5 Days       Follow-up Information     Follow up With Specialties Details Why Contact Info    Gemma Brandon NP Nurse Practitioner On 9/9/2020 Follow up appt with Dr. Eliane Matos Wednesday 9/09/2020 at 1:00pm  Janet Zavala [address] [place] [id number]  [phone]            Time spent in patient discharge planning and coordination 41 minutes.     Signed:  Tevin Aguilar MD

## 2020-09-04 NOTE — PROGRESS NOTES
Problem: Falls - Risk of  Goal: *Absence of Falls  Description: Document Reshma March Fall Risk and appropriate interventions in the flowsheet.   Outcome: Progressing Towards Goal  Note: Fall Risk Interventions:  Mobility Interventions: Assess mobility with egress test, Communicate number of staff needed for ambulation/transfer, Patient to call before getting OOB         Medication Interventions: Patient to call before getting OOB, Teach patient to arise slowly    Elimination Interventions: Call light in reach, Patient to call for help with toileting needs, Urinal in reach    History of Falls Interventions: Bed/chair exit alarm, Room close to nurse's station, Door open when patient unattended         Problem: Patient Education: Go to Patient Education Activity  Goal: Patient/Family Education  Outcome: Progressing Towards Goal     Problem: Patient Education: Go to Patient Education Activity  Goal: Patient/Family Education  Outcome: Progressing Towards Goal     Problem: Patient Education: Go to Patient Education Activity  Goal: Patient/Family Education  Outcome: Progressing Towards Goal

## 2020-09-04 NOTE — PROGRESS NOTES
Problem: Falls - Risk of  Goal: *Absence of Falls  Description: Document Jean-Claude Brand Fall Risk and appropriate interventions in the flowsheet.   9/4/2020 1135 by Shay Sahu RN  Outcome: Resolved/Met  Note: Fall Risk Interventions:  Mobility Interventions: Assess mobility with egress test, Communicate number of staff needed for ambulation/transfer, Patient to call before getting OOB         Medication Interventions: Patient to call before getting OOB, Teach patient to arise slowly    Elimination Interventions: Call light in reach, Patient to call for help with toileting needs, Urinal in reach    History of Falls Interventions: Bed/chair exit alarm, Room close to nurse's station, Door open when patient unattended      9/4/2020 0940 by Shay Sahu RN  Outcome: Progressing Towards Goal  Note: Fall Risk Interventions:  Mobility Interventions: Assess mobility with egress test, Communicate number of staff needed for ambulation/transfer, Patient to call before getting OOB         Medication Interventions: Patient to call before getting OOB, Teach patient to arise slowly    Elimination Interventions: Call light in reach, Patient to call for help with toileting needs, Urinal in reach    History of Falls Interventions: Bed/chair exit alarm, Room close to nurse's station, Door open when patient unattended         Problem: Patient Education: Go to Patient Education Activity  Goal: Patient/Family Education  9/4/2020 1135 by Shay Sahu RN  Outcome: Resolved/Met  9/4/2020 0940 by Shay Sahu RN  Outcome: Progressing Towards Goal     Problem: Patient Education: Go to Patient Education Activity  Goal: Patient/Family Education  9/4/2020 1135 by Shay Sahu RN  Outcome: Resolved/Met  9/4/2020 0940 by Shay Sahu RN  Outcome: Progressing Towards Goal     Problem: Patient Education: Go to Patient Education Activity  Goal: Patient/Family Education  9/4/2020 1135 by Shay Sahu RN  Outcome: Resolved/Met  9/4/2020 0940 by Rebecca Youssef, RN  Outcome: Progressing Towards Goal

## 2020-09-04 NOTE — PROGRESS NOTES
Care Management Interventions  Palliative Care Criteria Met (RRAT>21 & CHF Dx)?: No(Risk 9% Dx Myxedema)  Mode of Transport at Discharge: Other (see comment)(daughter will transport home)  Transition of Care Consult (CM Consult): Discharge Planning  Discharge Durable Medical Equipment: No(cane)  Physical Therapy Consult: Yes  Occupational Therapy Consult: Yes  Speech Therapy Consult: No  Current Support Network: Lives Alone(Daughter assist as needed Burton Peabody [phone])  Confirm Follow Up Transport: Family(daughter Makeda Curry provides transportation)  The Patient and/or Patient Representative was Provided with a Choice of Provider and Agrees with the Discharge Plan?: Yes  Discharge Location  Discharge Placement: Home with family assistance  Met with patient for d/c. Patient states he is ready for d/c today. He voices no concerns or needs. Discussed that PT notes recommended home health f/u but he declined and states he walked and did fine. He lives alone in an apartment on [address] with 3 steps to entrance. DME includes cane. He states he is hard of hearing and can't see in one eye and his daughter Makeda Curry helps him with groceries and transportation. Patient has [employer] states able to obtain medications at [facility] [phone]. Patient to d/c home with daughter assistance as needed.

## 2020-09-08 ENCOUNTER — PATIENT OUTREACH (OUTPATIENT)
Dept: CASE MANAGEMENT | Age: 65
End: 2020-09-08

## 2020-09-08 NOTE — PROGRESS NOTES
Initial [facility] outreach attempt to patient's home/mobile number was unsuccessful. Left message to return call. Will attempt second outreach within 24 hours.

## 2020-09-09 ENCOUNTER — PATIENT OUTREACH (OUTPATIENT)
Dept: CASE MANAGEMENT | Age: 65
End: 2020-09-09

## 2020-09-09 NOTE — PROGRESS NOTES
Transition of Care Hospital Discharge Follow-Up      Date/Time:  2020 10:24 AM    Patient was admitted to [facility] on 2020 and discharged on 2020 for Myxedema Come. The physician discharge summary was available at the time of outreach. Patient was contacted within 7 business days of discharge. Inpatient RUR score: 22  Was this a readmission? no   Patient stated reason for the readmission:none  Patients top risk factors for readmission: Myxedema Come      LPN Care Coordinator contacted the patient by telephone to perform post hospital discharge assessment. Verified name and  with patient as identifiers. Provided introduction to self, and explanation of the Care Coordinator role. Patient received hospital discharge instructions. LPN reviewed discharge instructions and red flags with patient who verbalized understanding. Patient given an opportunity to ask questions and does not have any further questions or concerns at this time. The patient agrees to contact the PCP office for questions related to their healthcare. LPN provided contact information for future reference. Home Health orders at discharge: [address]  Date of initial or scheduled visit:   (Assist with coordination of services if necessary.)    Durable Medical Equipment ordered at discharge: none  [address]:   [address] [facility] received:   (Assist patient in obtaining DME orders &/or equipment if necessary.)    Medication(s):   Medication review was performed with patient, who verbalizes understanding of administration of home medications. There were no barriers to obtaining medications identified at this time. Current Outpatient Medications   Medication Sig    levothyroxine (SYNTHROID) 100 mcg tablet Take 1 Tab by mouth Daily (before breakfast) for 90 days.  amLODIPine (NORVASC) 2.5 mg tablet Take 1 Tab by mouth daily for 30 days.     acetaminophen (TYLENOL ARTHRITIS PAIN) 650 mg TbER Take 1,300 mg by mouth two (2) times a day.  fexofenadine (ALLEGRA) 180 mg tablet Take 1 Tab by mouth daily. No current facility-administered medications for this visit. There are no discontinued medications. ADL assessment:   (If patient is unable to perform ADLs  what is the limiting factor(s)? Do they have a support system that can assist? If no support system is present, discuss possible assistance that they may be able to obtain. Escalate for SW if ongoing issues are verbalized by pt or anticipated)    [facility] follow up appointment(s):   Future Appointments   Date Time Provider Mark Armenta   9/9/2020  1:00 PM Anusha Tom NP [facility]   9/10/2020 10:20 AM Amira Pearson MD [address] [facility]      Non-[facility] follow up appointment(s):   7 Day follow up with PCP or Specialist: Dallas Huynh NP 9/9/2020  Transportation arranged: none    Covid Risk Education    Patient has following risk factors of: Myxedema Coma. Education provided regarding infection prevention, and signs and symptoms of COVID-19 and when to seek medical attention with patient who verbalized understanding. Discussed exposure protocols and quarantine From CDC: Are you at higher risk for severe illness?  and given an opportunity for questions and concerns. The patient agrees to contact the COVID-19 hotline [phone] or PCP office for questions related to COVID-19. For more information on steps you can take to protect yourself, see CDC's How to Protect Yourself     Patient/family/caregiver given information for Jae Nevarez and agrees to enroll no  Patient's preferred e-mail: declines  Patient's preferred phone number: declines      Any other questions or concerns expressed by patient? Patient voiced no concerns at this time    Scheduled next follow up call or referral to CTN/ ACM:  Next follow up call:within 14 days    Goals Addressed                 This Visit's Progress     Attends follow up appointments on schedule        Takes Medications as prescribed

## 2020-09-23 ENCOUNTER — PATIENT OUTREACH (OUTPATIENT)
Dept: CASE MANAGEMENT | Age: 65
End: 2020-09-23

## 2020-09-23 NOTE — PROGRESS NOTES
Attempted to call patient to f/u on LORI call. Unable to reach , left voice message. According to [facility] patient had a f/u appointment with Dr. Karyna Alfaro on 9/9/2020. This Care coordinator will graduate this patient from this program, patient will be added back if phone call is returned. Episode Resolved.

## 2020-11-18 ENCOUNTER — HOSPITAL ENCOUNTER (OUTPATIENT)
Dept: GENERAL RADIOLOGY | Age: 65
Discharge: HOME | End: 2020-11-18
Attending: INTERNAL MEDICINE
Payer: MEDICARE

## 2020-11-18 DIAGNOSIS — M25.531 PAIN AND SWELLING OF RIGHT WRIST: ICD-10-CM

## 2020-11-18 DIAGNOSIS — M25.431 PAIN AND SWELLING OF RIGHT WRIST: ICD-10-CM

## 2020-11-18 PROCEDURE — 73630 X-RAY EXAM OF FOOT: CPT

## 2020-11-18 PROCEDURE — 73130 X-RAY EXAM OF HAND: CPT

## 2020-12-26 ENCOUNTER — APPOINTMENT (OUTPATIENT)
Dept: CT IMAGING | Age: 65
DRG: 164 | End: 2020-12-26
Attending: EMERGENCY MEDICINE
Payer: MEDICARE

## 2020-12-26 ENCOUNTER — APPOINTMENT (OUTPATIENT)
Dept: INTERVENTIONAL RADIOLOGY/VASCULAR | Age: 65
DRG: 164 | End: 2020-12-26
Attending: RADIOLOGY
Payer: MEDICARE

## 2020-12-26 ENCOUNTER — ANESTHESIA (OUTPATIENT)
Dept: SURGERY | Age: 65
DRG: 164 | End: 2020-12-26
Payer: MEDICARE

## 2020-12-26 ENCOUNTER — HOSPITAL ENCOUNTER (INPATIENT)
Age: 65
LOS: 1 days | Discharge: OTHER INSTITUTION | DRG: 164 | End: 2020-12-26
Attending: EMERGENCY MEDICINE | Admitting: HOSPITALIST
Payer: MEDICARE

## 2020-12-26 ENCOUNTER — HOSPITAL ENCOUNTER (INPATIENT)
Age: 65
LOS: 2 days | Discharge: HOME | DRG: 164 | End: 2020-12-28
Attending: RADIOLOGY | Admitting: HOSPITALIST
Payer: MEDICARE

## 2020-12-26 ENCOUNTER — ANESTHESIA EVENT (OUTPATIENT)
Dept: SURGERY | Age: 65
DRG: 164 | End: 2020-12-26
Payer: MEDICARE

## 2020-12-26 VITALS
BODY MASS INDEX: 24.79 KG/M2 | SYSTOLIC BLOOD PRESSURE: 112 MMHG | DIASTOLIC BLOOD PRESSURE: 84 MMHG | WEIGHT: 210 LBS | TEMPERATURE: 98.3 F | OXYGEN SATURATION: 98 % | RESPIRATION RATE: 25 BRPM | HEART RATE: 126 BPM | HEIGHT: 77 IN

## 2020-12-26 DIAGNOSIS — R09.02 HYPOXIA: ICD-10-CM

## 2020-12-26 DIAGNOSIS — I26.99 OTHER ACUTE PULMONARY EMBOLISM WITHOUT ACUTE COR PULMONALE (HCC): Primary | ICD-10-CM

## 2020-12-26 LAB
ALBUMIN SERPL-MCNC: 3.4 G/DL (ref 3.2–4.6)
ALBUMIN/GLOB SERPL: 0.9 {RATIO} (ref 1.2–3.5)
ALP SERPL-CCNC: 83 U/L (ref 50–136)
ALT SERPL-CCNC: 22 U/L (ref 12–65)
ANION GAP SERPL CALC-SCNC: 10 MMOL/L (ref 7–16)
APTT PPP: 39.8 SEC (ref 24.1–35.1)
AST SERPL-CCNC: 38 U/L (ref 15–37)
BASOPHILS # BLD: 0 K/UL (ref 0–0.2)
BASOPHILS NFR BLD: 0 % (ref 0–2)
BILIRUB SERPL-MCNC: 0.9 MG/DL (ref 0.2–1.1)
BUN SERPL-MCNC: 14 MG/DL (ref 8–23)
CALCIUM SERPL-MCNC: 9.1 MG/DL (ref 8.3–10.4)
CHLORIDE SERPL-SCNC: 106 MMOL/L (ref 98–107)
CO2 SERPL-SCNC: 26 MMOL/L (ref 21–32)
CREAT SERPL-MCNC: 0.99 MG/DL (ref 0.8–1.5)
DIFFERENTIAL METHOD BLD: ABNORMAL
EOSINOPHIL # BLD: 0.1 K/UL (ref 0–0.8)
EOSINOPHIL NFR BLD: 1 % (ref 0.5–7.8)
ERYTHROCYTE [DISTWIDTH] IN BLOOD BY AUTOMATED COUNT: 13.5 % (ref 11.9–14.6)
GLOBULIN SER CALC-MCNC: 4 G/DL (ref 2.3–3.5)
GLUCOSE SERPL-MCNC: 133 MG/DL (ref 65–100)
HCT VFR BLD AUTO: 49.4 % (ref 41.1–50.3)
HGB BLD-MCNC: 15.9 G/DL (ref 13.6–17.2)
IMM GRANULOCYTES # BLD AUTO: 0.1 K/UL (ref 0–0.5)
IMM GRANULOCYTES NFR BLD AUTO: 1 % (ref 0–5)
INR PPP: 1.2
LYMPHOCYTES # BLD: 1.1 K/UL (ref 0.5–4.6)
LYMPHOCYTES NFR BLD: 12 % (ref 13–44)
MAGNESIUM SERPL-MCNC: 2.3 MG/DL (ref 1.8–2.4)
MCH RBC QN AUTO: 33 PG (ref 26.1–32.9)
MCHC RBC AUTO-ENTMCNC: 32.2 G/DL (ref 31.4–35)
MCV RBC AUTO: 102.5 FL (ref 79.6–97.8)
MONOCYTES # BLD: 0.6 K/UL (ref 0.1–1.3)
MONOCYTES NFR BLD: 6 % (ref 4–12)
NEUTS SEG # BLD: 7.2 K/UL (ref 1.7–8.2)
NEUTS SEG NFR BLD: 80 % (ref 43–78)
NRBC # BLD: 0 K/UL (ref 0–0.2)
PLATELET # BLD AUTO: 149 K/UL (ref 150–450)
PMV BLD AUTO: 10.6 FL (ref 9.4–12.3)
POTASSIUM SERPL-SCNC: 3.5 MMOL/L (ref 3.5–5.1)
PROT SERPL-MCNC: 7.4 G/DL (ref 6.3–8.2)
PROTHROMBIN TIME: 15.4 SEC (ref 12.5–14.7)
RBC # BLD AUTO: 4.82 M/UL (ref 4.23–5.6)
SODIUM SERPL-SCNC: 142 MMOL/L (ref 136–145)
TROPONIN-HIGH SENSITIVITY: 2261 PG/ML (ref 0–14)
TROPONIN-HIGH SENSITIVITY: 230.1 PG/ML (ref 0–14)
WBC # BLD AUTO: 9.1 K/UL (ref 4.3–11.1)

## 2020-12-26 PROCEDURE — 75774 ARTERY X-RAY EACH VESSEL: CPT

## 2020-12-26 PROCEDURE — C1757 CATH, THROMBECTOMY/EMBOLECT: HCPCS

## 2020-12-26 PROCEDURE — 96360 HYDRATION IV INFUSION INIT: CPT

## 2020-12-26 PROCEDURE — 74011000258 HC RX REV CODE- 258: Performed by: EMERGENCY MEDICINE

## 2020-12-26 PROCEDURE — C1894 INTRO/SHEATH, NON-LASER: HCPCS

## 2020-12-26 PROCEDURE — 77030021532 HC CATH ANGI DX IMPRS MRTM -B

## 2020-12-26 PROCEDURE — 37185 PRIM ART M-THRMBC SBSQ VSL: CPT

## 2020-12-26 PROCEDURE — 96375 TX/PRO/DX INJ NEW DRUG ADDON: CPT

## 2020-12-26 PROCEDURE — 77030013558 HC DIL VESL COOK -A

## 2020-12-26 PROCEDURE — 85025 COMPLETE CBC W/AUTO DIFF WBC: CPT

## 2020-12-26 PROCEDURE — 96361 HYDRATE IV INFUSION ADD-ON: CPT

## 2020-12-26 PROCEDURE — 74011000636 HC RX REV CODE- 636: Performed by: RADIOLOGY

## 2020-12-26 PROCEDURE — 36015 PLACE CATHETER IN ARTERY: CPT

## 2020-12-26 PROCEDURE — 99285 EMERGENCY DEPT VISIT HI MDM: CPT

## 2020-12-26 PROCEDURE — 74011000250 HC RX REV CODE- 250: Performed by: RADIOLOGY

## 2020-12-26 PROCEDURE — 96374 THER/PROPH/DIAG INJ IV PUSH: CPT

## 2020-12-26 PROCEDURE — 77030004558 HC CATH ANGI DX SUPR TORQ CARD -A

## 2020-12-26 PROCEDURE — 80053 COMPREHEN METABOLIC PANEL: CPT

## 2020-12-26 PROCEDURE — 76060000034 HC ANESTHESIA 1.5 TO 2 HR: Performed by: RADIOLOGY

## 2020-12-26 PROCEDURE — 85610 PROTHROMBIN TIME: CPT

## 2020-12-26 PROCEDURE — 65660000000 HC RM CCU STEPDOWN

## 2020-12-26 PROCEDURE — 2709999900 HC NON-CHARGEABLE SUPPLY

## 2020-12-26 PROCEDURE — 83735 ASSAY OF MAGNESIUM: CPT

## 2020-12-26 PROCEDURE — 02CR3ZZ EXTIRPATION OF MATTER FROM LEFT PULMONARY ARTERY, PERCUTANEOUS APPROACH: ICD-10-PCS | Performed by: RADIOLOGY

## 2020-12-26 PROCEDURE — 02CQ3ZZ EXTIRPATION OF MATTER FROM RIGHT PULMONARY ARTERY, PERCUTANEOUS APPROACH: ICD-10-PCS | Performed by: RADIOLOGY

## 2020-12-26 PROCEDURE — 37184 PRIM ART M-THRMBC 1ST VSL: CPT

## 2020-12-26 PROCEDURE — 74011000250 HC RX REV CODE- 250: Performed by: ANESTHESIOLOGY

## 2020-12-26 PROCEDURE — 74011250636 HC RX REV CODE- 250/636: Performed by: EMERGENCY MEDICINE

## 2020-12-26 PROCEDURE — 71260 CT THORAX DX C+: CPT

## 2020-12-26 PROCEDURE — 84484 ASSAY OF TROPONIN QUANT: CPT

## 2020-12-26 PROCEDURE — 85730 THROMBOPLASTIN TIME PARTIAL: CPT

## 2020-12-26 PROCEDURE — 77030011229 HC DIL VESL COON COOK -A

## 2020-12-26 PROCEDURE — 74011250636 HC RX REV CODE- 250/636: Performed by: RADIOLOGY

## 2020-12-26 PROCEDURE — 74011250636 HC RX REV CODE- 250/636: Performed by: ANESTHESIOLOGY

## 2020-12-26 PROCEDURE — 74011000636 HC RX REV CODE- 636: Performed by: EMERGENCY MEDICINE

## 2020-12-26 PROCEDURE — C1769 GUIDE WIRE: HCPCS

## 2020-12-26 PROCEDURE — 93005 ELECTROCARDIOGRAM TRACING: CPT | Performed by: EMERGENCY MEDICINE

## 2020-12-26 PROCEDURE — 74011250636 HC RX REV CODE- 250/636: Performed by: HOSPITALIST

## 2020-12-26 RX ORDER — HEPARIN SODIUM 5000 [USP'U]/100ML
18-36 INJECTION, SOLUTION INTRAVENOUS CONTINUOUS
Status: DISCONTINUED | OUTPATIENT
Start: 2020-12-26 | End: 2020-12-26 | Stop reason: HOSPADM

## 2020-12-26 RX ORDER — LEVOTHYROXINE SODIUM 75 UG/1
150 TABLET ORAL
Status: DISCONTINUED | OUTPATIENT
Start: 2020-12-27 | End: 2020-12-26 | Stop reason: HOSPADM

## 2020-12-26 RX ORDER — CLONIDINE HYDROCHLORIDE 0.1 MG/1
0.2 TABLET ORAL
Status: DISCONTINUED | OUTPATIENT
Start: 2020-12-26 | End: 2020-12-26 | Stop reason: HOSPADM

## 2020-12-26 RX ORDER — FENTANYL CITRATE 50 UG/ML
INJECTION, SOLUTION INTRAMUSCULAR; INTRAVENOUS AS NEEDED
Status: DISCONTINUED | OUTPATIENT
Start: 2020-12-26 | End: 2020-12-26 | Stop reason: HOSPADM

## 2020-12-26 RX ORDER — CLONIDINE HYDROCHLORIDE 0.1 MG/1
0.2 TABLET ORAL
Status: DISCONTINUED | OUTPATIENT
Start: 2020-12-26 | End: 2020-12-28 | Stop reason: HOSPADM

## 2020-12-26 RX ORDER — SODIUM CHLORIDE 0.9 % (FLUSH) 0.9 %
10 SYRINGE (ML) INJECTION
Status: COMPLETED | OUTPATIENT
Start: 2020-12-26 | End: 2020-12-26

## 2020-12-26 RX ORDER — ONDANSETRON 2 MG/ML
4 INJECTION INTRAMUSCULAR; INTRAVENOUS
Status: DISCONTINUED | OUTPATIENT
Start: 2020-12-26 | End: 2020-12-26 | Stop reason: HOSPADM

## 2020-12-26 RX ORDER — ACETAMINOPHEN 650 MG/1
650 SUPPOSITORY RECTAL
Status: DISCONTINUED | OUTPATIENT
Start: 2020-12-26 | End: 2020-12-28 | Stop reason: HOSPADM

## 2020-12-26 RX ORDER — SODIUM CHLORIDE 9 MG/ML
50 INJECTION, SOLUTION INTRAVENOUS CONTINUOUS
Status: DISPENSED | OUTPATIENT
Start: 2020-12-26 | End: 2020-12-27

## 2020-12-26 RX ORDER — PROPOFOL 10 MG/ML
INJECTION, EMULSION INTRAVENOUS
Status: DISCONTINUED | OUTPATIENT
Start: 2020-12-26 | End: 2020-12-26 | Stop reason: HOSPADM

## 2020-12-26 RX ORDER — SODIUM CHLORIDE 0.9 % (FLUSH) 0.9 %
5-40 SYRINGE (ML) INJECTION AS NEEDED
Status: DISCONTINUED | OUTPATIENT
Start: 2020-12-26 | End: 2020-12-26 | Stop reason: HOSPADM

## 2020-12-26 RX ORDER — ACETAMINOPHEN 650 MG/1
650 SUPPOSITORY RECTAL
Status: DISCONTINUED | OUTPATIENT
Start: 2020-12-26 | End: 2020-12-26 | Stop reason: HOSPADM

## 2020-12-26 RX ORDER — SENNOSIDES 8.6 MG/1
2 TABLET ORAL
Status: DISCONTINUED | OUTPATIENT
Start: 2020-12-26 | End: 2020-12-28 | Stop reason: HOSPADM

## 2020-12-26 RX ORDER — IBUPROFEN 200 MG
1 TABLET ORAL
Status: DISCONTINUED | OUTPATIENT
Start: 2020-12-26 | End: 2020-12-26 | Stop reason: HOSPADM

## 2020-12-26 RX ORDER — LIDOCAINE HYDROCHLORIDE 20 MG/ML
1-10 INJECTION, SOLUTION INFILTRATION; PERINEURAL ONCE
Status: COMPLETED | OUTPATIENT
Start: 2020-12-26 | End: 2020-12-26

## 2020-12-26 RX ORDER — PREDNISONE 5 MG/1
15 TABLET ORAL DAILY
Status: DISCONTINUED | OUTPATIENT
Start: 2020-12-27 | End: 2020-12-26 | Stop reason: HOSPADM

## 2020-12-26 RX ORDER — HEPARIN SODIUM 5000 [USP'U]/ML
5000 INJECTION, SOLUTION INTRAVENOUS; SUBCUTANEOUS
Status: DISCONTINUED | OUTPATIENT
Start: 2020-12-26 | End: 2020-12-26 | Stop reason: DRUGHIGH

## 2020-12-26 RX ORDER — AMLODIPINE BESYLATE 5 MG/1
2.5 TABLET ORAL DAILY
Status: DISCONTINUED | OUTPATIENT
Start: 2020-12-27 | End: 2020-12-26 | Stop reason: HOSPADM

## 2020-12-26 RX ORDER — MIDAZOLAM HYDROCHLORIDE 1 MG/ML
INJECTION, SOLUTION INTRAMUSCULAR; INTRAVENOUS AS NEEDED
Status: DISCONTINUED | OUTPATIENT
Start: 2020-12-26 | End: 2020-12-26 | Stop reason: HOSPADM

## 2020-12-26 RX ORDER — ACETAMINOPHEN 325 MG/1
650 TABLET ORAL
Status: DISCONTINUED | OUTPATIENT
Start: 2020-12-26 | End: 2020-12-28 | Stop reason: HOSPADM

## 2020-12-26 RX ORDER — SENNOSIDES 8.6 MG/1
2 TABLET ORAL
Status: DISCONTINUED | OUTPATIENT
Start: 2020-12-26 | End: 2020-12-26 | Stop reason: HOSPADM

## 2020-12-26 RX ORDER — POTASSIUM CHLORIDE 20 MEQ/1
40 TABLET, EXTENDED RELEASE ORAL
Status: DISCONTINUED | OUTPATIENT
Start: 2020-12-26 | End: 2020-12-26 | Stop reason: HOSPADM

## 2020-12-26 RX ORDER — HEPARIN SODIUM 200 [USP'U]/100ML
1000 INJECTION, SOLUTION INTRAVENOUS CONTINUOUS
Status: DISCONTINUED | OUTPATIENT
Start: 2020-12-26 | End: 2020-12-26 | Stop reason: HOSPADM

## 2020-12-26 RX ORDER — ONDANSETRON 2 MG/ML
INJECTION INTRAMUSCULAR; INTRAVENOUS AS NEEDED
Status: DISCONTINUED | OUTPATIENT
Start: 2020-12-26 | End: 2020-12-26 | Stop reason: HOSPADM

## 2020-12-26 RX ORDER — IBUPROFEN 200 MG
1 TABLET ORAL
Status: DISCONTINUED | OUTPATIENT
Start: 2020-12-26 | End: 2020-12-28 | Stop reason: HOSPADM

## 2020-12-26 RX ORDER — SODIUM CHLORIDE 0.9 % (FLUSH) 0.9 %
5-40 SYRINGE (ML) INJECTION EVERY 8 HOURS
Status: DISCONTINUED | OUTPATIENT
Start: 2020-12-26 | End: 2020-12-28 | Stop reason: HOSPADM

## 2020-12-26 RX ORDER — ONDANSETRON 2 MG/ML
4 INJECTION INTRAMUSCULAR; INTRAVENOUS
Status: DISCONTINUED | OUTPATIENT
Start: 2020-12-26 | End: 2020-12-28 | Stop reason: HOSPADM

## 2020-12-26 RX ORDER — SODIUM CHLORIDE 0.9 % (FLUSH) 0.9 %
5-40 SYRINGE (ML) INJECTION AS NEEDED
Status: DISCONTINUED | OUTPATIENT
Start: 2020-12-26 | End: 2020-12-28 | Stop reason: HOSPADM

## 2020-12-26 RX ORDER — ACETAMINOPHEN 325 MG/1
650 TABLET ORAL
Status: DISCONTINUED | OUTPATIENT
Start: 2020-12-26 | End: 2020-12-26 | Stop reason: HOSPADM

## 2020-12-26 RX ORDER — HEPARIN SODIUM 5000 [USP'U]/ML
80 INJECTION, SOLUTION INTRAVENOUS; SUBCUTANEOUS ONCE
Status: COMPLETED | OUTPATIENT
Start: 2020-12-26 | End: 2020-12-26

## 2020-12-26 RX ORDER — HEPARIN SODIUM 5000 [USP'U]/100ML
18-36 INJECTION, SOLUTION INTRAVENOUS
Status: DISCONTINUED | OUTPATIENT
Start: 2020-12-26 | End: 2020-12-27

## 2020-12-26 RX ORDER — SODIUM CHLORIDE 0.9 % (FLUSH) 0.9 %
5-40 SYRINGE (ML) INJECTION EVERY 8 HOURS
Status: DISCONTINUED | OUTPATIENT
Start: 2020-12-26 | End: 2020-12-26 | Stop reason: HOSPADM

## 2020-12-26 RX ADMIN — FENTANYL CITRATE 50 MCG: 50 INJECTION INTRAMUSCULAR; INTRAVENOUS at 18:07

## 2020-12-26 RX ADMIN — IOPAMIDOL 100 ML: 612 INJECTION, SOLUTION INTRAVENOUS at 18:30

## 2020-12-26 RX ADMIN — FENTANYL CITRATE 50 MCG: 50 INJECTION INTRAMUSCULAR; INTRAVENOUS at 17:58

## 2020-12-26 RX ADMIN — MIDAZOLAM 1 MG: 1 INJECTION INTRAMUSCULAR; INTRAVENOUS at 17:58

## 2020-12-26 RX ADMIN — PHENYLEPHRINE HYDROCHLORIDE 100 MCG: 10 INJECTION INTRAVENOUS at 18:27

## 2020-12-26 RX ADMIN — SODIUM CHLORIDE 100 ML: 900 INJECTION, SOLUTION INTRAVENOUS at 13:02

## 2020-12-26 RX ADMIN — PHENYLEPHRINE HYDROCHLORIDE 100 MCG: 10 INJECTION INTRAVENOUS at 18:30

## 2020-12-26 RX ADMIN — PROPOFOL 25 MCG/KG/MIN: 10 INJECTION, EMULSION INTRAVENOUS at 18:12

## 2020-12-26 RX ADMIN — Medication 10 ML: at 13:02

## 2020-12-26 RX ADMIN — LIDOCAINE HYDROCHLORIDE 80 MG: 20 INJECTION, SOLUTION INFILTRATION; PERINEURAL at 18:00

## 2020-12-26 RX ADMIN — Medication 10 ML: at 21:45

## 2020-12-26 RX ADMIN — PHENYLEPHRINE HYDROCHLORIDE 100 MCG: 10 INJECTION INTRAVENOUS at 18:43

## 2020-12-26 RX ADMIN — HEPARIN SODIUM 7600 UNITS: 5000 INJECTION INTRAVENOUS; SUBCUTANEOUS at 13:33

## 2020-12-26 RX ADMIN — HEPARIN SODIUM 6000 UNITS: 200 INJECTION, SOLUTION INTRAVENOUS at 18:08

## 2020-12-26 RX ADMIN — SODIUM CHLORIDE 50 ML/HR: 900 INJECTION, SOLUTION INTRAVENOUS at 20:42

## 2020-12-26 RX ADMIN — IOPAMIDOL 100 ML: 755 INJECTION, SOLUTION INTRAVENOUS at 13:02

## 2020-12-26 RX ADMIN — HEPARIN SODIUM 18 UNITS/KG/HR: 5000 INJECTION, SOLUTION INTRAVENOUS at 13:35

## 2020-12-26 RX ADMIN — ONDANSETRON 4 MG: 2 INJECTION INTRAMUSCULAR; INTRAVENOUS at 19:25

## 2020-12-26 RX ADMIN — SODIUM CHLORIDE 1000 ML: 900 INJECTION, SOLUTION INTRAVENOUS at 12:24

## 2020-12-26 RX ADMIN — MIDAZOLAM 1 MG: 1 INJECTION INTRAMUSCULAR; INTRAVENOUS at 18:08

## 2020-12-26 NOTE — ROUTINE PROCESS
TRANSFER - OUT REPORT: 
 
Verbal report given to Denisse Tyson RN(name) on [facility]  being transferred to [facility](unit) for routine progression of care Report consisted of patients Situation, Background, Assessment and  
Recommendations(SBAR). Information from the following report(s) ED Summary was reviewed with the receiving nurse. Lines:  
Peripheral IV 12/26/20 Right Antecubital (Active) Site Assessment Clean, dry, & intact 12/26/20 1257 Phlebitis Assessment 0 12/26/20 1257 Infiltration Assessment 0 12/26/20 1257 Peripheral IV 12/26/20 Right Forearm (Active) Site Assessment Clean, dry, & intact 12/26/20 1341 Phlebitis Assessment 0 12/26/20 1341 Infiltration Assessment 0 12/26/20 1341 Dressing Status Clean, dry, & intact 12/26/20 1341 Opportunity for questions and clarification was provided. Patient transported with: 
 O2 @ 3 liters

## 2020-12-26 NOTE — PROGRESS NOTES
Patient bathed per the ICU CHG wipes . His skin is very dry with 3 second tinting. Scabbing on his left arm. He states it is from a fall.  2 IV's in the right arm, one 20 gauge and one 22 gauge. Scab on the left shin. No backside breakdown.

## 2020-12-26 NOTE — ED PROVIDER NOTES
39-year-old white male history of pulmonary embolism roughly 2 years ago was on Xarelto for approximately a year however due to financial reasons he discontinued taking it. This morning around 10 AM he had acute onset shortness of breath while sitting at home. He denies chest pain. No fever or cough. No abdominal pain, back pain or leg pain. The history is provided by the patient. Shortness of Breath  Pertinent negatives include no fever, no headaches, no neck pain, no cough, no chest pain, no vomiting, no abdominal pain and no rash. Past Medical History:   Diagnosis Date    Arthritis     B12 deficiency 10/19/2018    Change in vision     Chronic hip pain 10/20/2016    Deep vein thrombosis (DVT) of proximal lower extremity ([facility] [address] 3/21/2019    Edema leg     Endocrine disease     hypothyroidism    GERD (gastroesophageal reflux disease)     well controlled on meds    High cholesterol     Hypertension     Iron deficiency anemia     Lupus anticoagulant positive     Murmur     Muscle weakness     Myalgia     Neurological disorder     HA    Pelvic pain in male     Polymyalgia rheumatica ([facility] [address] 10/20/2016    Psychiatric disorder     depression    Pulmonary emboli (HCC) 12/21/2018    SDH [facility]subdural hematoma) [facility])     HA, memory loss    Thyroid disease     hypo.         Past Surgical History:   Procedure Laterality Date    HX OTHER SURGICAL      Subdural Hematoma    HX TONSILLECTOMY           Family History:   Problem Relation Age of Onset    Cancer Mother     Hypertension Father     Heart Attack Father     Arthritis-osteo Father     Heart Attack Brother        Social History     Socioeconomic History    Marital status: SINGLE     Spouse name: Not on file    Number of children: Not on file    Years of education: Not on file    Highest education level: Not on file   Occupational History    Not on file   Social Needs    Financial resource strain: Not on file    Food insecurity Worry: Not on file     Inability: Not on file    Transportation needs     Medical: Not on file     Non-medical: Not on file   Tobacco Use    Smoking status: Former Smoker     Packs/day: 2.00     Years: 10.00     Pack years: 20.00     Types: Cigarettes     Quit date: 1999     Years since quittin.0    Smokeless tobacco: Former User    Tobacco comment: Chew, quit 15 years ago. Substance and Sexual Activity    Alcohol use: Yes     Alcohol/week: 11.7 standard drinks     Types: 14 Shots of liquor per week    Drug use: No    Sexual activity: Never   Lifestyle    Physical activity     Days per week: Not on file     Minutes per session: Not on file    Stress: Not on file   Relationships    Social connections     Talks on phone: Not on file     Gets together: Not on file     Attends Taoism service: Not on file     Active member of club or organization: Not on file     Attends meetings of clubs or organizations: Not on file     Relationship status: Not on file    Intimate partner violence     Fear of current or ex partner: Not on file     Emotionally abused: Not on file     Physically abused: Not on file     Forced sexual activity: Not on file   Other Topics Concern    Not on file   Social History Narrative    Not on file         ALLERGIES: Pcn [penicillins] and Amoxicillin    Review of Systems   Constitutional: Negative for fever. HENT: Negative for congestion. Respiratory: Positive for shortness of breath. Negative for cough. Cardiovascular: Negative for chest pain. Gastrointestinal: Negative for abdominal pain, nausea and vomiting. Genitourinary: Negative for dysuria. Musculoskeletal: Negative for back pain and neck pain. Skin: Negative for rash. Neurological: Negative for headaches.        Vitals:    20 1145   BP: (!) 141/82   Pulse: (!) 121   Resp: 24   Temp: 97.6 °F (36.4 °C)   SpO2: 95%   Weight: 95.3 kg (210 lb)   Height: 6' 5\" (1.956 m)            Physical Exam  Vitals signs and nursing note reviewed. Constitutional:       General: He is not in acute distress. Appearance: Normal appearance. He is not toxic-appearing. HENT:      Head: Normocephalic and atraumatic. Nose: Nose normal.      Mouth/Throat:      Mouth: Mucous membranes are moist.      Pharynx: Oropharynx is clear. Eyes:      Conjunctiva/sclera: Conjunctivae normal.      Pupils: Pupils are equal, round, and reactive to light. Neck:      Musculoskeletal: Normal range of motion and neck supple. Cardiovascular:      Rate and Rhythm: Regular rhythm. Tachycardia present. Heart sounds: No murmur. Pulmonary:      Effort: Pulmonary effort is normal.      Breath sounds: Normal breath sounds. No wheezing or rales. Abdominal:      General: There is no distension. Palpations: Abdomen is soft. Tenderness: There is no abdominal tenderness. There is no guarding. Musculoskeletal: Normal range of motion. General: No swelling or tenderness. Skin:     General: Skin is warm and dry. Neurological:      Mental Status: He is alert and oriented to person, place, and time. Psychiatric:         Mood and Affect: Mood normal.         Behavior: Behavior normal.          MDM  Number of Diagnoses or Management Options  Diagnosis management comments: Confirms large bilateral pulmonary emboli. Lab work is unremarkable except for mildly elevated troponin at 230 likely related to pulmonary embolism. EKG shows tachycardia without ST changes or strain. Patient has been treated with IV fluids, oxygen and heparin. Hospitalist consulted for admission. Critical care time 35 minutes management of pulmonary emboli with hypoxia.        Amount and/or Complexity of Data Reviewed  Clinical lab tests: ordered and reviewed  Tests in the radiology section of CPT®: ordered and reviewed  Tests in the medicine section of CPT®: ordered and reviewed  Discuss the patient with other providers: yes  Independent visualization of images, tracings, or specimens: yes    Risk of Complications, Morbidity, and/or Mortality  Presenting problems: high  Diagnostic procedures: high  Management options: high           Procedures

## 2020-12-26 NOTE — ED TRIAGE NOTES
Pt to ED via EMS for sudden onset of SOB. Pt has history of PE and states he stopped taking blood thinners a year ago. Pt denies f/n/v/d. Pt currently on 3l n/c and states he does not wear oxygen at home.   Mask on pt

## 2020-12-26 NOTE — H&P
INTERNAL MEDICINE H&P/CONSULT    Subjective:     57-year-old white male history of pulmonary embolism roughly 2 years ago was on Xarelto for approximately a year however due to financial reasons he discontinued taking it. This morning around 10 AM he had acute onset shortness of breath while sitting at home. He denies chest pain. No fever or cough. No abdominal pain, back pain or leg pain. Pt is not a tobacco smoker and has no known family hx of blood clots or miscarriages. In ED, he is hypoxic on 3L, tachycardic and moderate distress. He stopped taking Xarelto after 11 months from his 1st episode due to financial reason.       Past Medical History:   Diagnosis Date    Arthritis     B12 deficiency 10/19/2018    Change in vision     Chronic hip pain 10/20/2016    Deep vein thrombosis (DVT) of proximal lower extremity ([facility]) 3/21/2019    Edema leg     Endocrine disease     hypothyroidism    GERD (gastroesophageal reflux disease)     well controlled on meds    High cholesterol     Hypertension     Iron deficiency anemia     Lupus anticoagulant positive     Murmur     Muscle weakness     Myalgia     Neurological disorder     HA    Pelvic pain in male     Polymyalgia rheumatica ([address] 10/20/2016    Psychiatric disorder     depression    Pulmonary emboli (HCC) 12/21/2018    SDH (subdural hematoma) ([facility])     HA, memory loss    Thyroid disease     hypo. Past Surgical History:   Procedure Laterality Date    HX OTHER SURGICAL      Subdural Hematoma    HX TONSILLECTOMY        Prior to Admission medications    Medication Sig Start Date End Date Taking? Authorizing Provider   predniSONE (DELTASONE) 5 mg tablet Take 3 Tabs by mouth daily. 11/3/20   Gregg Greco MD   amLODIPine (NORVASC) 2.5 mg tablet Take 1 Tab by mouth daily. 10/16/20   Cira Giles NP   levothyroxine (SYNTHROID) 150 mcg tablet Take 1 Tab by mouth Daily (before breakfast).  10/16/20   Cira Giles NP Allergies   Allergen Reactions    Pcn [Penicillins] Unknown (comments)    Amoxicillin Hives      Social History     Tobacco Use    Smoking status: Former Smoker     Packs/day: 2.00     Years: 10.00     Pack years: 20.00     Types: Cigarettes     Quit date: 1999     Years since quittin.0    Smokeless tobacco: Former User    Tobacco comment: Chew, quit 15 years ago. Substance Use Topics    Alcohol use: Yes     Alcohol/week: 11.7 standard drinks     Types: 14 Shots of liquor per week        Family History:  HTN    Review of Systems   A comprehensive review of systems was negative except for that written in the HPI. Objective:     Physical Exam:  There were no vitals taken for this visit. General appearance: awake, alert, cooperative, moderate distress, appears stated age   Head: Normocephalic, without obvious abnormality, atraumatic  Back: symmetric, no curvature. ROM normal. No CVA tenderness. Lungs: clear to auscultation bilaterally - Diminished bs bibasilar. Bilateral expiratory wheezing with prolonged expiratory phase. Bibasilar fine crepitations. Heart: regular rhythm, S1, S2 normal, no murmur, click, rub or gallop. - Tachycardia  Abdomen: soft, no tenderness, no distension, normal bowel sound, no masses, no organomegaly  Extremities: atraumatic, no cyanosis - Bilateral lower limbs edema none. Skin: No rashes or ulceration.   Neurologic: Grossly intact     ECG: sinus rhythm     Data Review (Labs):   Recent Results (from the past 24 hour(s))   CBC WITH AUTOMATED DIFF    Collection Time: 20 11:55 AM   Result Value Ref Range    WBC 9.1 4.3 - 11.1 K/uL    RBC 4.82 4.23 - 5.6 M/uL    HGB 15.9 13.6 - 17.2 g/dL    HCT 49.4 41.1 - 50.3 %    .5 (H) 79.6 - 97.8 FL    MCH 33.0 (H) 26.1 - 32.9 PG    MCHC 32.2 31.4 - 35.0 g/dL    RDW 13.5 11.9 - 14.6 %    PLATELET 182 (L) 902 - 450 K/uL    MPV 10.6 9.4 - 12.3 FL    ABSOLUTE NRBC 0.00 0.0 - 0.2 K/uL    DF AUTOMATED      NEUTROPHILS 80 (H) 43 - 78 %    LYMPHOCYTES 12 (L) 13 - 44 %    MONOCYTES 6 4.0 - 12.0 %    EOSINOPHILS 1 0.5 - 7.8 %    BASOPHILS 0 0.0 - 2.0 %    IMMATURE GRANULOCYTES 1 0.0 - 5.0 %    ABS. NEUTROPHILS 7.2 1.7 - 8.2 K/UL    ABS. LYMPHOCYTES 1.1 0.5 - 4.6 K/UL    ABS. MONOCYTES 0.6 0.1 - 1.3 K/UL    ABS. EOSINOPHILS 0.1 0.0 - 0.8 K/UL    ABS. BASOPHILS 0.0 0.0 - 0.2 K/UL    ABS. IMM. GRANS. 0.1 0.0 - 0.5 K/UL   METABOLIC PANEL, COMPREHENSIVE    Collection Time: 12/26/20 11:55 AM   Result Value Ref Range    Sodium 142 136 - 145 mmol/L    Potassium 3.5 3.5 - 5.1 mmol/L    Chloride 106 98 - 107 mmol/L    CO2 26 21 - 32 mmol/L    Anion gap 10 7 - 16 mmol/L    Glucose 133 (H) 65 - 100 mg/dL    BUN 14 8 - 23 MG/DL    Creatinine 0.99 0.8 - 1.5 MG/DL    GFR est AA >60 >60 ml/min/1.73m2    GFR est non-AA >60 >60 ml/min/1.73m2    Calcium 9.1 8.3 - 10.4 MG/DL    Bilirubin, total 0.9 0.2 - 1.1 MG/DL    ALT (SGPT) 22 12 - 65 U/L    AST (SGOT) 38 (H) 15 - 37 U/L    Alk. phosphatase 83 50 - 136 U/L    Protein, total 7.4 6.3 - 8.2 g/dL    Albumin 3.4 3.2 - 4.6 g/dL    Globulin 4.0 (H) 2.3 - 3.5 g/dL    A-G Ratio 0.9 (L) 1.2 - 3.5     TROPONIN-HIGH SENSITIVITY    Collection Time: 12/26/20 11:55 AM   Result Value Ref Range    Troponin-High Sensitivity 230.1 (HH) 0 - 14 pg/mL   PROTHROMBIN TIME + INR    Collection Time: 12/26/20 11:55 AM   Result Value Ref Range    Prothrombin time 15.4 (H) 12.5 - 14.7 sec    INR 1.2     PTT    Collection Time: 12/26/20 11:55 AM   Result Value Ref Range    aPTT 39.8 (H) 24.1 - 35.1 SEC   MAGNESIUM    Collection Time: 12/26/20 11:55 AM   Result Value Ref Range    Magnesium 2.3 1.8 - 2.4 mg/dL   TROPONIN-HIGH SENSITIVITY    Collection Time: 12/26/20  3:26 PM   Result Value Ref Range    Troponin-High Sensitivity 2,261.0 (HH) 0 - 14 pg/mL       Assessment:     1- Bilateral PE, recurrent, has genetic predisposition per history. With RV strain today, tachycardia and abnormal troponin.   2- Ac hypoxic resp failure dt #1  3- HTN, not well controlled  4- Polymyalgia rheumatica. Plan:     Telemetry  Heparin gtt  I spoke to IR who decided to do thrombectomy today  Follow echo  O2 as needed  Blood pressure control  AM lab  Continue essential home medications.  assisted with outpatient Xarelto. Patient is full code. Further management depends on patient progress. Thank you for the oppourtinity to contribute in the care of your patient. Time 35 minutes.     Signed By: Aissatou Uriostegui MD     December 26, 2020

## 2020-12-26 NOTE — PROGRESS NOTES
Patient anticipated to transfer DT. Consult placed to help with Xarelto. SW provided the patient with a Xarelto voucher ($10 copay), provided education on voucher and placed it in the patient's belonging bag.      JOMAR Dunne    [facility]    * [email or web address]

## 2020-12-26 NOTE — PROGRESS NOTES
TRANSFER - OUT REPORT:    Verbal report given to Abelardo Senior RN(name) on Nora Cueva  being transferred to [facility] IR(unit) for ordered procedure       Report consisted of patients Situation, Background, Assessment and   Recommendations(SBAR). Information from the following report(s) Kardex was reviewed with the receiving nurse. Lines:   Peripheral IV 12/26/20 Right Antecubital (Active)   Site Assessment Clean, dry, & intact 12/26/20 1257   Phlebitis Assessment 0 12/26/20 1257   Infiltration Assessment 0 12/26/20 1257       Peripheral IV 12/26/20 Right Forearm (Active)   Site Assessment Clean, dry, & intact 12/26/20 1341   Phlebitis Assessment 0 12/26/20 1341   Infiltration Assessment 0 12/26/20 1341   Dressing Status Clean, dry, & intact 12/26/20 1341        Opportunity for questions and clarification was provided.       Patient transported with:   Monitor  O2 @ 4 liters   [employer]

## 2020-12-26 NOTE — ANESTHESIA PREPROCEDURE EVALUATION
Relevant Problems   No relevant active problems       Anesthetic History   No history of anesthetic complications            Review of Systems / Medical History  Patient summary reviewed and pertinent labs reviewed    Pulmonary          Smoker      Comments: Pulmonary embolus   Neuro/Psych             Comments: H/O subdural hematoma about 7 years ago Cardiovascular    Hypertension: well controlled          Hyperlipidemia    Exercise tolerance: >4 METS     GI/Hepatic/Renal     GERD: well controlled    Renal disease (Acute renal injury)       Endo/Other      Hypothyroidism: well controlled  Anemia (h/o GEOFF)     Other Findings   Comments: Polymyalgia rheumatica         Physical Exam    Airway  Mallampati: II  TM Distance: 4 - 6 cm  Neck ROM: normal range of motion   Mouth opening: Normal     Cardiovascular    Rhythm: regular  Rate: normal         Dental  No notable dental hx       Pulmonary  Breath sounds clear to auscultation               Abdominal  GI exam deferred       Other Findings            Anesthetic Plan    ASA: 4, emergent  Anesthesia type: total IV anesthesia          Induction: Intravenous  Anesthetic plan and risks discussed with: Patient

## 2020-12-26 NOTE — CONSULTS
IR consulted by Dr. Rosa Mott to evaluate for PE intervention. Briefly, this is a 78 yo male with reported genetic hypercoagulability who presents with recurrent PE. CT with bilateral PE with large clot burden and right heart strain. Cardiac biomarkers are positive. Tachycardia to the 130s with hypoxia on nasal cannula oxygen. He is normotensive. Findings are c/w intermediate/high risk submassive PE. Plan for catheter directed mechanical thrombectomy.      Wily Marin MD  Interventional Radiology

## 2020-12-27 LAB
ANION GAP SERPL CALC-SCNC: 5 MMOL/L (ref 7–16)
ATRIAL RATE: 128 BPM
BUN SERPL-MCNC: 15 MG/DL (ref 8–23)
CALCIUM SERPL-MCNC: 8.1 MG/DL (ref 8.3–10.4)
CALCULATED P AXIS, ECG09: 74 DEGREES
CALCULATED R AXIS, ECG10: 106 DEGREES
CALCULATED T AXIS, ECG11: 55 DEGREES
CHLORIDE SERPL-SCNC: 109 MMOL/L (ref 98–107)
CO2 SERPL-SCNC: 27 MMOL/L (ref 21–32)
CREAT SERPL-MCNC: 0.85 MG/DL (ref 0.8–1.5)
DIAGNOSIS, 93000: NORMAL
ERYTHROCYTE [DISTWIDTH] IN BLOOD BY AUTOMATED COUNT: 13.4 % (ref 11.9–14.6)
GLUCOSE SERPL-MCNC: 116 MG/DL (ref 65–100)
HCT VFR BLD AUTO: 36.1 % (ref 41.1–50.3)
HGB BLD-MCNC: 11.7 G/DL (ref 13.6–17.2)
MCH RBC QN AUTO: 33 PG (ref 26.1–32.9)
MCHC RBC AUTO-ENTMCNC: 32.4 G/DL (ref 31.4–35)
MCV RBC AUTO: 101.7 FL (ref 79.6–97.8)
NRBC # BLD: 0 K/UL (ref 0–0.2)
P-R INTERVAL, ECG05: 148 MS
PLATELET # BLD AUTO: 113 K/UL (ref 150–450)
PMV BLD AUTO: 10.8 FL (ref 9.4–12.3)
POTASSIUM SERPL-SCNC: 4.4 MMOL/L (ref 3.5–5.1)
Q-T INTERVAL, ECG07: 284 MS
QRS DURATION, ECG06: 88 MS
QTC CALCULATION (BEZET), ECG08: 414 MS
RBC # BLD AUTO: 3.55 M/UL (ref 4.23–5.6)
SODIUM SERPL-SCNC: 141 MMOL/L (ref 136–145)
UFH PPP CHRO-ACNC: 0.82 IU/ML (ref 0.3–0.7)
VENTRICULAR RATE, ECG03: 128 BPM
WBC # BLD AUTO: 7.4 K/UL (ref 4.3–11.1)

## 2020-12-27 PROCEDURE — 85520 HEPARIN ASSAY: CPT

## 2020-12-27 PROCEDURE — 74011250636 HC RX REV CODE- 250/636: Performed by: FAMILY MEDICINE

## 2020-12-27 PROCEDURE — 74011250637 HC RX REV CODE- 250/637: Performed by: INTERNAL MEDICINE

## 2020-12-27 PROCEDURE — 85027 COMPLETE CBC AUTOMATED: CPT

## 2020-12-27 PROCEDURE — 36415 COLL VENOUS BLD VENIPUNCTURE: CPT

## 2020-12-27 PROCEDURE — 80048 BASIC METABOLIC PNL TOTAL CA: CPT

## 2020-12-27 PROCEDURE — 65660000000 HC RM CCU STEPDOWN

## 2020-12-27 RX ORDER — AMLODIPINE BESYLATE 5 MG/1
2.5 TABLET ORAL DAILY
Status: DISCONTINUED | OUTPATIENT
Start: 2020-12-27 | End: 2020-12-28 | Stop reason: HOSPADM

## 2020-12-27 RX ORDER — LEVOTHYROXINE SODIUM 75 UG/1
150 TABLET ORAL
Status: DISCONTINUED | OUTPATIENT
Start: 2020-12-27 | End: 2020-12-28 | Stop reason: HOSPADM

## 2020-12-27 RX ADMIN — Medication 5 ML: at 21:40

## 2020-12-27 RX ADMIN — Medication 5 ML: at 05:08

## 2020-12-27 RX ADMIN — Medication 10 ML: at 13:31

## 2020-12-27 RX ADMIN — APIXABAN 10 MG: 5 TABLET, FILM COATED ORAL at 21:40

## 2020-12-27 RX ADMIN — AMLODIPINE BESYLATE 2.5 MG: 5 TABLET ORAL at 08:38

## 2020-12-27 RX ADMIN — HEPARIN SODIUM 18 UNITS/KG/HR: 5000 INJECTION, SOLUTION INTRAVENOUS at 04:18

## 2020-12-27 RX ADMIN — LEVOTHYROXINE SODIUM 150 MCG: 0.07 TABLET ORAL at 08:38

## 2020-12-27 RX ADMIN — APIXABAN 10 MG: 5 TABLET, FILM COATED ORAL at 14:34

## 2020-12-27 NOTE — PROGRESS NOTES
TRANSFER - OUT REPORT:    Verbal report given to Shaq Aldrich RN on [facility]  being transferred to [address] [place] [address] for routine post - op       Report consisted of patients Situation, Background, Assessment and   Recommendations(SBAR). Information from the following report(s) SBAR, Kardex, Procedure Summary, MAR and Cardiac Rhythm Sinus Tach was reviewed with the receiving nurse. Lines:       Opportunity for questions and clarification was provided.       Patient transported with:   Monitor  O2 @ 1 liters

## 2020-12-27 NOTE — PROGRESS NOTES
Mr. Ania Dempsey was seen in follow up after PE thrombectomy in IR yesterday evening. Majority of the thrombus was able to be removed. He is doing well with much improved SOB. HR improved from 130s to 90s. Right groin site is c/d/i with some expected tenderness. Consider transitioning from heparin to xarleto today. Will need lifelong anticoagulation. Will need right groin stitch removed tomorrow.     Lito Bueno MD  Interventional Radiology

## 2020-12-27 NOTE — ROUTINE PROCESS
Bedside and Verbal shift change report given to Darshan Merritt RN (oncoming nurse) by self, RN (offgoing nurse). Report included the following information SBAR, Kardex, Intake/Output, MAR and Recent Results. Right groin puncture site visualized with oncoming RN. Dressing CDI.

## 2020-12-27 NOTE — PROGRESS NOTES
Hospitalist Progress Note     Admit Date:  2020  5:15 PM   Name:  Jose Thomason   Age:  77 y.o.  :  1955   MRN:  [id number]   PCP:  Ivan Amador NP  Treatment Team: Attending Provider: Gustavo Perez DO; Primary Nurse: Becca Matta; Utilization Review: April Alfaro    Subjective:   HPI and or CC:  18-year-old male history of pulmonary embolism 2 years ago took Xarelto for about 11 months and stopped secondary to inability to afford. He presented with submassive pulmonary embolus and underwent thrombectomy yesterday. Currently on heparin drip but discussing transition to Eliquis and financial assistance for long-term Eliquis therapy. Have consulted pharmacy for transition. Underwent successful thrombectomy in interventional radiology secondary to submassive PE.   Needs lifelong anticoagulation        Objective:     Patient Vitals for the past 24 hrs:   Temp Pulse Resp BP SpO2   20 0839 97.7 °F (36.5 °C) 94 22 117/66 95 %   20 0838  93      20 0417 98.3 °F (36.8 °C) 87 22 116/79 98 %   20 0124 97.7 °F (36.5 °C) 91 18 103/72 94 %   20 97.7 °F (36.5 °C) (!) 101 16 (!) 119/91 95 %   20  100 16 108/70 98 %   20  (!) 102 16 103/71 98 %   20  (!) 102 16 99/70 98 %   20  100 16 103/70 98 %   20  (!) 107 16 108/71 98 %   20 98.8 °F (37.1 °C) (!) 105 16 116/72 99 %   20 98.8 °F (37.1 °C) (!) 105 16 116/72 99 %   20    116/72 98 %     Oxygen Therapy  O2 Sat (%): 95 % (20 0839)  Pulse via Oximetry: 101 beats per minute (20)  O2 Device: Room air (20 0834)  O2 Flow Rate (L/min): 1 l/min (20 0418)  ETCO2 (mmHg): 1 mmHg (20)    Intake/Output Summary (Last 24 hours) at 2020 1209  Last data filed at 2020 0834  Gross per 24 hour   Intake 840 ml   Output 450 ml   Net 390 ml         REVIEW OF SYSTEMS: Comprehensive ROS performed and negative except as stated in HPI. Physical Examination:  General:    Well nourished. No gross distress. Head:  Normocephalic, atraumatic, nares patent  Eyes:  Extraocular movements intact, normal sclera  CV:   RRR. No  Murmurs, clicks, or gallops, distal pulses intact  Lungs:   Unlabored, no cyanosis, no wheeze  Abdomen:   Soft, nondistended, nontender. Extremities: Warm and dry. No cyanosis or edema. Skin:     No rashes or jaundice. Neuro:  No gross focal deficits, no tremor  Psych:  Mood and affect appropriate    Data Review:  I have reviewed all labs, meds, telemetry events, and studies from the last 24 hours.     Recent Results (from the past 24 hour(s))   TROPONIN-HIGH SENSITIVITY    Collection Time: 12/26/20  3:26 PM   Result Value Ref Range    Troponin-High Sensitivity 2,261.0 (HH) 0 - 14 pg/mL   METABOLIC PANEL, BASIC    Collection Time: 12/27/20  3:34 AM   Result Value Ref Range    Sodium 141 136 - 145 mmol/L    Potassium 4.4 3.5 - 5.1 mmol/L    Chloride 109 (H) 98 - 107 mmol/L    CO2 27 21 - 32 mmol/L    Anion gap 5 (L) 7 - 16 mmol/L    Glucose 116 (H) 65 - 100 mg/dL    BUN 15 8 - 23 MG/DL    Creatinine 0.85 0.8 - 1.5 MG/DL    GFR est AA >60 >60 ml/min/1.73m2    GFR est non-AA >60 >60 ml/min/1.73m2    Calcium 8.1 (L) 8.3 - 10.4 MG/DL   CBC W/O DIFF    Collection Time: 12/27/20  3:34 AM   Result Value Ref Range    WBC 7.4 4.3 - 11.1 K/uL    RBC 3.55 (L) 4.23 - 5.6 M/uL    HGB 11.7 (L) 13.6 - 17.2 g/dL    HCT 36.1 (L) 41.1 - 50.3 %    .7 (H) 79.6 - 97.8 FL    MCH 33.0 (H) 26.1 - 32.9 PG    MCHC 32.4 31.4 - 35.0 g/dL    RDW 13.4 11.9 - 14.6 %    PLATELET 993 (L) 094 - 450 K/uL    MPV 10.8 9.4 - 12.3 FL    ABSOLUTE NRBC 0.00 0.0 - 0.2 K/uL   HEPARIN XA UFH    Collection Time: 12/27/20  3:34 AM   Result Value Ref Range    Heparin Xa UFH 0.82 (H) 0.3 - 0.7 IU/mL        All Micro Results     None          Current Meds:  Current Facility-Administered Medications Medication Dose Route Frequency    levothyroxine (SYNTHROID) tablet 150 mcg  150 mcg Oral ACB    amLODIPine (NORVASC) tablet 2.5 mg  2.5 mg Oral DAILY    0.9% sodium chloride infusion  50 mL/hr IntraVENous CONTINUOUS    sodium chloride (NS) flush 5-40 mL  5-40 mL IntraVENous Q8H    sodium chloride (NS) flush 5-40 mL  5-40 mL IntraVENous PRN    acetaminophen (TYLENOL) tablet 650 mg  650 mg Oral Q6H PRN    Or    acetaminophen (TYLENOL) suppository 650 mg  650 mg Rectal Q6H PRN    senna (SENOKOT) tablet 17.2 mg  2 Tab Oral BID PRN    ondansetron (ZOFRAN) injection 4 mg  4 mg IntraVENous Q6H PRN    nicotine (NICODERM CQ) 21 mg/24 hr patch 1 Patch  1 Patch TransDERmal DAILY PRN    cloNIDine HCL (CATAPRES) tablet 0.2 mg  0.2 mg Oral BID PRN    heparin 25,000 units in dextrose 500 mL infusion  18-36 Units/kg/hr IntraVENous TITRATE       Diet:  DIET CARDIAC    Other Studies (last 24 hours):  Ct Chest W Cont    Result Date: 12/26/2020  PE protocol chest CT 12/26/2020 Comparison: Chest CT 12/21/2018, chest x-ray 09/02/2020 and prior Indication: Shortness of breath, tachycardia Technique: Multiple contiguous 2.5 mm axial images were obtained through the pulmonary vasculature following uneventful administration of IV contrast, Isovue 370 100 cc. Intravenous contrast was used for better evaluation of solid organs and vascular structures. Radiation dose reduction techniques were used for this study:  Our CT scanners use one or all of the following: Automated exposure control, adjustment of the mA and/or kVp according to patient's size, iterative reconstruction. Findings: An adequate bolus opacifies the pulmonary vasculature. Moderate volume bilateral pulmonary emboli right greater than left without complete occlusion. Chronic cardiac enlargement with persistent dilatation of the right ventricle right atrium similar to previous study. Diffuse mild pulmonary artery enlargement. No effusion or pneumothorax.  No lobar consolidation. Limited arterial phase evaluation of the upper abdomen is unremarkable. Review of bone windows demonstrates no aggressive appearing bony lesions. IMPRESSION: 1. Possible for pulmonary embolism. These findings were discussed with Dr. April Gavin on 12/26/2020 1:18 PM by Dr. Addie Carr. Radha Yun. Assessment and Plan:     Hospital Problems as of 12/27/2020 Date Reviewed: 8/29/2019          Codes Class Noted - Resolved POA    Pulmonary emboli ([facility] [address] ICD-10-CM: I26.99  ICD-9-CM: 415.19  12/26/2020 - Present Unknown        Hypertension ICD-10-CM: I10  ICD-9-CM: 401.9  10/20/2016 - Present Yes        Polymyalgia rheumatica ([facility] [address] (Chronic) ICD-10-CM: M35.3  ICD-9-CM: 917  10/20/2016 - Present Yes        Hypothyroidism ICD-10-CM: E03.9  ICD-9-CM: 244.9  2/10/2011 - Present Yes              A/P:    Transition to Eliquis therapy todayCase management regarding financial assistance. Patient lives on [address] with rent consuming most of his Social Security check. If not able to obtain Eliquis patient will need to be bridged with Lovenox and warfarin but not clear that he could afford Lovenox. Will need home health. Hopefully we can assist him with Eliquis and possible discharge as soon as tomorrow. Wean off oxygen. History of hypertensioncurrently controlled    History of polymyalgia rheumaticacurrently asymptomatic. Signed:  Reji PERES

## 2020-12-27 NOTE — ANESTHESIA POSTPROCEDURE EVALUATION
Procedure(s):  IR ANESTHESIA. MAC    Anesthesia Post Evaluation      Multimodal analgesia: multimodal analgesia used between 6 hours prior to anesthesia start to PACU discharge  Patient location during evaluation: PACU  Patient participation: complete - patient participated  Level of consciousness: awake and alert  Pain management: adequate  Airway patency: patent  Anesthetic complications: no  Cardiovascular status: acceptable  Respiratory status: acceptable  Hydration status: acceptable  Post anesthesia nausea and vomiting:  none  Final Post Anesthesia Temperature Assessment:  Normothermia (36.0-37.5 degrees C)      INITIAL Post-op Vital signs:   Vitals Value Taken Time   /70 12/26/20 2001   Temp     Pulse 101 12/26/20 2003   Resp 16 12/26/20 2001   SpO2 98 % 12/26/20 2003   Vitals shown include unvalidated device data.

## 2020-12-27 NOTE — PROGRESS NOTES
According to radiologist note, patient should continue heparin. Medication not ordered in MAR anymore. Spoke with Dr. Ashby Severance. New orders received to reorder PE protocol Heparin gtt. Patient currently infusing at 18 units/kg/hr.

## 2020-12-27 NOTE — ROUTINE PROCESS
TRANSFER - IN REPORT: 
 
Verbal report received from Melanie Platt RN(name) on [employer]  being received from IR(unit) for routine progression of care Report consisted of patients Situation, Background, Assessment and  
Recommendations(SBAR). Information from the following report(s) SBAR, Kardex, Intake/Output, MAR and Recent Results was reviewed with the receiving nurse. Opportunity for questions and clarification was provided. Dual skin assessment completed. Sacrum intact and blanchable. Heels are dry and intact. Tattoos scattered over the entire body. Right groin puncture s/p thrombectomy. Dressing CDI. Abrasions on LUE and BLE present. No other abnormalities noted. Assessment completed upon patients arrival to unit and care assumed.

## 2020-12-27 NOTE — PROCEDURES
Department of Interventional Radiology  [phone]        Interventional Radiology Brief Procedure Note    Patient: Tisha Mendez MRN: [id number]  SSN: [id number]    YOB: 1955  Age: 77 y.o. Sex: male      Date of Procedure: 12/26/2020    Pre-Procedure Diagnosis: Intermediate/High Risk Submassive PE    Post-Procedure Diagnosis: SAME    Procedure(s): Bilateral pulmonary angiogram with mechanical thombectomy    Brief Description of Procedure: Bilateral pulmonary angiography with large clot burden on the right, small clot burden on the left. Majority of thrombus able to be removed from the right. Small amount of thrombus removed from left (less clot burden). Right CFV access (24 Fr sheath). Performed By: Mare Bowen MD     Assistants: None    Anesthesia:TIVS/MAC    Estimated Blood Loss: 400 ml    Specimens:  None    Implants:  None    Findings: As above    Complications: None    Recommendations: Right leg straight for 4 hours. Continue heparin gtt.        Signed By: Mare Bowen MD     December 26, 2020

## 2020-12-28 VITALS
DIASTOLIC BLOOD PRESSURE: 87 MMHG | TEMPERATURE: 98.1 F | SYSTOLIC BLOOD PRESSURE: 118 MMHG | OXYGEN SATURATION: 95 % | BODY MASS INDEX: 24.58 KG/M2 | WEIGHT: 208.2 LBS | HEIGHT: 77 IN | HEART RATE: 92 BPM | RESPIRATION RATE: 18 BRPM

## 2020-12-28 PROCEDURE — 74011250637 HC RX REV CODE- 250/637: Performed by: INTERNAL MEDICINE

## 2020-12-28 RX ADMIN — LEVOTHYROXINE SODIUM 150 MCG: 0.07 TABLET ORAL at 08:06

## 2020-12-28 RX ADMIN — APIXABAN 10 MG: 5 TABLET, FILM COATED ORAL at 10:38

## 2020-12-28 RX ADMIN — AMLODIPINE BESYLATE 2.5 MG: 5 TABLET ORAL at 08:06

## 2020-12-28 RX ADMIN — Medication 5 ML: at 06:21

## 2020-12-28 NOTE — PROGRESS NOTES
CM met with patient to discuss d/c plan and consult. Patient alert/orient x4. Patient verified demographic no changes needed. Verified PCP and insurance. Patient states he uses [facility] [address] for medication and states he's having issues with purchasing Xarelto medication, states that he can't afford the medication. Patient was provided a Eliquis coupon for 30 day supply free. Patient stats he will discuss the issue more with his PCP. Patient will be d/c home today and will need transport home. Patient will get round trip set up when he's ready for d/c home. CM will continue to monitor and remain available for any needs or concerns that may occur. Care Management Interventions  PCP Verified by CM: Yes(Sherley Wood, NP)  Mode of Transport at Discharge:  Other (see comment)([employer] transport )  Transition of Care Consult (CM Consult): Discharge Planning  Discharge Durable Medical Equipment: No  Physical Therapy Consult: No  Occupational Therapy Consult: No  Speech Therapy Consult: No  Current Support Network: Own Home, Lives Alone  Confirm Follow Up Transport: Cab  The Patient and/or Patient Representative was Provided with a Choice of Provider and Agrees with the Discharge Plan?: No  Name of the Patient Representative Who was Provided with a Choice of Provider and Agrees with the Discharge Plan: patient   Freedom of Choice List was Provided with Basic Dialogue that Supports the Patient's Individualized Plan of Care/Goals, Treatment Preferences and Shares the Quality Data Associated with the Providers?: No  [place] Resource Information Provided?: No  Discharge Location  Discharge Placement: Home

## 2020-12-28 NOTE — PROGRESS NOTES
I have reviewed discharge instructions with the patient. The patient verbalized understanding. New medication (eliquis) education given to the patient.

## 2020-12-28 NOTE — DISCHARGE SUMMARY
Hospitalist Discharge Summary     Admit Date:  2020  5:15 PM   Name:  Dionisio Moeller   Age:  77 y.o.  :  1955   MRN:  [id number]   PCP:  Zahraa Harman NP  Treatment Team: Attending Provider: Herbert Woodard DO; Utilization Review: Kemal Wagner; Primary Nurse: Mino Boo    Problem List for this Hospitalization:  Hospital Problems as of 2020 Date Reviewed: 2019          Codes Class Noted - Resolved POA    * (Principal) Pulmonary emboli ([facility] [address] ICD-10-CM: I26.99  ICD-9-CM: 415.19  2020 - Present Unknown        Hypertension ICD-10-CM: I10  ICD-9-CM: 401.9  10/20/2016 - Present Yes        Polymyalgia rheumatica ([facility] [address] (Chronic) ICD-10-CM: M35.3  ICD-9-CM: 218  10/20/2016 - Present Yes        Hypothyroidism ICD-10-CM: E03.9  ICD-9-CM: 244.9  2/10/2011 - Present Yes                Admission 2020:    Discharge 2020      Hospital Course:  13-year-old male history of pulmonary embolus 2 years ago took Xarelto for approximately 11-month and discontinued he presented with submassive pulmonary embolus with evidence of RV dysfunction and underwent thrombectomy by interventional radiologist on . Heparin drip initially converted to Eliquis therapy 10 mg twice daily for 7 days evening of . He will need another 12 doses crossover 10 mg twice daily and then start 5 mg twice daily. He will discussed possible conversion to warfarin based on affordability of Eliquis after he investigate with his insurance company. He was given a coupon for first 30-day. He understands the importance of maintaining consistent continued therapeutic anticoagulation. Should he need to convert to warfarin he needs to start soon for crossover and will need assistance of provider supervision. He appears to understand. He would also benefit from eventual follow-up echocardiogram to assess RV size and function.   He may be discharged home for outpatient follow-up. Follow up instructions and discharge meds at bottom of this note. Plan was discussed with patient and staff. All questions answered. Patient was stable at time of discharge. 10 systems reviewed and negative except as noted in HPI. Diagnostic Imaging/Tests:   Ct Chest W Cont    Result Date: 12/26/2020  PE protocol chest CT 12/26/2020 Comparison: Chest CT 12/21/2018, chest x-ray 09/02/2020 and prior Indication: Shortness of breath, tachycardia Technique: Multiple contiguous 2.5 mm axial images were obtained through the pulmonary vasculature following uneventful administration of IV contrast, Isovue 370 100 cc. Intravenous contrast was used for better evaluation of solid organs and vascular structures. Radiation dose reduction techniques were used for this study:  Our CT scanners use one or all of the following: Automated exposure control, adjustment of the mA and/or kVp according to patient's size, iterative reconstruction. Findings: An adequate bolus opacifies the pulmonary vasculature. Moderate volume bilateral pulmonary emboli right greater than left without complete occlusion. Chronic cardiac enlargement with persistent dilatation of the right ventricle right atrium similar to previous study. Diffuse mild pulmonary artery enlargement. No effusion or pneumothorax. No lobar consolidation. Limited arterial phase evaluation of the upper abdomen is unremarkable. Review of bone windows demonstrates no aggressive appearing bony lesions. IMPRESSION: 1. Possible for pulmonary embolism. These findings were discussed with Dr. Chloe Lopez on 12/26/2020 1:18 PM by Dr. Jaycee Heredia. Chantel Marcos.     Ir Thrombectomy [facility] Art Primary Non Oliva Or Intracranial    Result Date: 12/27/2020  PROCEDURE: Pulmonary angiography and interventions Procedural Personnel Attending physician(s): Cecille Cheadle Pre-procedure diagnosis: Acute intermediate/high risk submassive PE Post-procedure diagnosis: Same Indication: Submassive PE with right heart strain Complications: No immediate complications. IMPRESSION: Bilateral pulmonary angiography demonstrating proximal thrombus involving the lobar pulmonary arteries. Successful mechanical thrombectomy using the Inari Flowtriever device with removal of the majority thrombus. _______________________________________________________________ PROCEDURE SUMMARY: - Venous access with ultrasound guidance - Bilateral main pulmonary angiography - Superselective pulmonary angiography: Right upper lobe pulmonary artery - Mechanical thrombectomy of acute PE PROCEDURE DETAILS: Pre-procedure Consent: Informed consent for the procedure including risks, benefits and alternatives was obtained and time-out was performed prior to the procedure. Preparation: The site was prepared and draped using maximal sterile barrier technique including cutaneous antisepsis. Anesthesia/sedation Per anesthesia. Access Local anesthesia was administered. The vessel was sonographically evaluated and determined to be patent. Real time ultrasound was used to visualize needle entry into the vessel and a permanent image was stored. A 24 [place] Dryseal sheath was placed. Vein accessed: Right common femoral vein Access technique: Micropuncture set with 21 gauge needle Pulmonary angiography and interventions The pulmonary arterial system was catheterized using a 6 Yoruba angled pigtail catheter. Indication for angiography: Diagnostic angiography - There was no prior catheter-based angiographic study available and a full diagnostic study was performed. The decision to intervene was based on the diagnostic study.  Vessel catheterized: Right main pulmonary artery Findings: Multiple filling defects involving proximal lobar pulmonary arteries, consistent with the findings of acute PE seen on the recent CT exam. Vessel catheterized: Right upper lobe pulmonary artery Findings: Angiography of the right upper lobe pulmonary artery following catheter directed mechanical thrombectomy reveals marked improvement in perfusion in the right upper lobe. Vessel catheterized: Left main pulmonary artery Findings: Filling defects involving primarily left upper lobe pulmonary artery branches. The left lower lobe pulmonary artery is patent without significant clot burden. Mechanical thrombectomy location: Bilateral pulmonary arteries Mechanical thrombectomy device: Inari Flowtriever Mechanical thrombectomy type: Aspiration Using the Inari Flowtreiver device with assistance of an 035 Amplatz guidewire and 5 [place] Myesha guide catheter, numerous aspirations were performed in the pulmonary arteries bilaterally. The majority of the time was spent on the right pulmonary arterial system as this had majority of the clot burden as seen on the recent CT exam. Post-intervention angiography: Post thrombectomy angiography of the right and left pulmonary arteries demonstrates marked improvement with improved perfusion. Closure The sheath was removed and hemostasis was achieved with manual compression and a pursestring suture. A sterile dressing was applied. Contrast Contrast agent: Isovue-300 Contrast volume (mL): 100 Radiation Dose Cumulative dose: 307 mGy Estimated blood loss (mL): Less than 10       Echocardiogram results:  No results found for this visit on 12/26/20.       All Micro Results     None          Labs: Results:       BMP, Mg, Phos Recent Labs     12/27/20  0334 12/26/20  1155    142   K 4.4 3.5   * 106   CO2 27 26   AGAP 5* 10   BUN 15 14   CREA 0.85 0.99   CA 8.1* 9.1   * 133*   MG  --  2.3      CBC Recent Labs     12/27/20  0334 12/26/20  1155   WBC 7.4 9.1   RBC 3.55* 4.82   HGB 11.7* 15.9   HCT 36.1* 49.4   * 149*   GRANS  --  80*   LYMPH  --  12*   EOS  --  1   MONOS  --  6   BASOS  --  0   IG  --  1   ANEU  --  7.2   ABL  --  1.1   MANISHA  --  0.1   ABM  --  0.6   ABB  --  0.0   AIG  --  0.1      LFT Recent Labs     12/26/20  1155   ALT 22 AP 83   TP 7.4   ALB 3.4   GLOB 4.0*   AGRAT 0.9*      Cardiac Testing Lab Results   Component Value Date/Time    BNP 53 12/21/2018 12:53 PM    B-type Natriuretic Peptide 25.1 05/01/2019 08:27 AM      Coagulation Tests Lab Results   Component Value Date/Time    Prothrombin time 15.4 (H) 12/26/2020 11:55 AM    INR 1.2 12/26/2020 11:55 AM    aPTT 39.8 (H) 12/26/2020 11:55 AM    aPTT 61.4 (H) 12/22/2018 01:20 PM    aPTT 105.2 (H) 12/22/2018 06:17 AM      A1c No results found for: HBA1C, HGBE8, [id number]   Lipid Panel Lab Results   Component Value Date/Time    Cholesterol, total 194 05/01/2019 08:27 AM    HDL Cholesterol 80 05/01/2019 08:27 AM    LDL, calculated 93 05/01/2019 08:27 AM    VLDL, calculated 21 05/01/2019 08:27 AM    Triglyceride 107 05/01/2019 08:27 AM      Thyroid Panel Lab Results   Component Value Date/Time    TSH 2.050 11/27/2020 08:02 AM    TSH 15.400 (H) 10/09/2020 10:02 AM    T4, Free 0.3 (L) 09/02/2020 05:47 PM    T4, Free 1.1 12/21/2018 12:53 PM    T3, total 0.02 (L) 09/02/2020 05:47 PM        Most Recent UA Lab Results   Component Value Date/Time    Color Yellow 05/01/2019 08:27 AM    Appearance Clear 05/01/2019 08:27 AM    pH (UA) 5.5 05/01/2019 08:27 AM    Ketone Negative 05/01/2019 08:27 AM    Bilirubin Negative 05/01/2019 08:27 AM    Blood Negative 05/01/2019 08:27 AM    Nitrites Negative 05/01/2019 08:27 AM    Leukocyte Esterase Negative 05/01/2019 08:27 AM        Allergies   Allergen Reactions    Pcn [Penicillins] Unknown (comments)    Amoxicillin Hives     Immunization History   Administered Date(s) Administered    Tdap 06/06/2017       All Labs from Last 24 Hrs:  No results found for this or any previous visit (from the past 24 hour(s)).     Discharge Exam:  Patient Vitals for the past 24 hrs:   Temp Pulse Resp BP SpO2   12/28/20 0837 97.5 °F (36.4 °C) 96 18 137/74 92 %   12/28/20 0806  92  124/72    12/28/20 0401 98.2 °F (36.8 °C) 82 20 115/75 92 %   12/28/20 0112 98.1 °F (36.7 °C) 90 16 116/75 94 %   12/27/20 2027 99.6 °F (37.6 °C) 84 22 120/72 94 %   12/27/20 1757 97.8 °F (36.6 °C) 95 18 126/76 94 %   12/27/20 1218 97.8 °F (36.6 °C) 95 18 118/74 94 %     Oxygen Therapy  O2 Sat (%): 92 % (12/28/20 0837)  Pulse via Oximetry: 101 beats per minute (12/26/20 2001)  O2 Device: Room air (12/28/20 0804)  O2 Flow Rate (L/min): 1 l/min (12/27/20 0418)  ETCO2 (mmHg): 1 mmHg (12/26/20 1946)    Intake/Output Summary (Last 24 hours) at 12/28/2020 1003  Last data filed at 12/28/2020 0804  Gross per 24 hour   Intake 270 ml   Output 1755 ml   Net -1485 ml         Physical exam:  General:    Well nourished. Alert. No distress. Eyes:   Normal sclera. Extraocular movements intact. ENT:  Normocephalic, atraumatic. Moist mucous membranes  CV:   Regular rate and rhythm. No murmur, rub, or gallop. Lungs:  Clear to auscultation bilaterally. No wheezing, rhonchi, or rales. Abdomen: Soft, nontender, nondistended. Bowel sounds normal.   Extremities: Warm and dry. No cyanosis or edema. Neurologic: No focal deficits  Skin:     No rashes or jaundice. Psych:  Normal mood and affect. Discharge Info:   Current Discharge Medication List      START taking these medications    Details   apixaban (ELIQUIS) 5 mg tablet Take two twice daily for 6 doses and on 1/3/2021 in evening start one twice daily  Qty: 42 Tab, Refills: 0         CONTINUE these medications which have NOT CHANGED    Details   amLODIPine (NORVASC) 2.5 mg tablet Take 1 Tab by mouth daily. Qty: 90 Tab, Refills: 1    Associated Diagnoses: Essential hypertension      levothyroxine (SYNTHROID) 150 mcg tablet Take 1 Tab by mouth Daily (before breakfast).   Qty: 90 Tab, Refills: 1    Associated Diagnoses: Acquired hypothyroidism               Disposition: home    Activity: Activity as tolerated  Diet: DIET CARDIAC Regular    Follow-up Appointments   Procedures    FOLLOW UP VISIT Appointment in: 3 - 5 Days Follow-up with primary care as soon as possiblewithin 1 week     Follow-up with primary care as soon as possiblewithin 1 week     Standing Status:   Standing     Number of Occurrences:   1     Order Specific Question:   Appointment in     Answer:   3 - 5 Days         Follow-up Information     Follow up With Specialties Details Why Contact Info    Kei Sotomayor NP Nurse Practitioner   [address] [address]  [phone]                Time spent in patient discharge planning and coordination 44 minutes.     Signed:  Nehemias Drake

## 2020-12-28 NOTE — ROUTINE PROCESS
Bedside and Verbal shift change report given to self (oncoming nurse) by Yudy Dobbs RN  Report included the following information SBAR, Kardex, Intake/Output and MAR.

## 2020-12-28 NOTE — PROGRESS NOTES
Bedside and Verbal shift change report given to Eva Cerda RN (oncoming nurse) by self, RN (offgoing nurse). Report included the following information SBAR, Kardex, Intake/Output, MAR and Recent Results.

## 2020-12-28 NOTE — ROUTINE PROCESS
Bedside and Verbal shift change report given to self, RN (oncoming nurse) by Jonathan Mcclure RN (offgoing nurse). Report included the following information SBAR, Kardex, Intake/Output, MAR and Recent Results. Right radial cath site visualized with off going RN. Dressing CDI.

## 2020-12-28 NOTE — DISCHARGE INSTRUCTIONS
Patient Education        Learning About How to Prevent Blood Clots  What is a blood clot? A blood clot is a clump of blood that forms in a blood vessel, such as a vein or an artery. If a clot gets stuck in a blood vessel, it can cause serious problems like a deep vein thrombosis (DVT) or a pulmonary embolism. A DVT is a blood clot in certain veins of the legs, pelvis, or arms. It most often occurs in the legs. Blood clots in these veins need to be treated, because they can get bigger, break loose, and travel through the bloodstream to the heart and then to the lungs. This causes a pulmonary embolism. A pulmonary embolism is a sudden blockage of an artery in the lung. Blood clots in the deep veins of the leg are the most common cause of a pulmonary embolism. In many cases, the clots are small. They may damage the lung. But if the clot is large and stops blood flow to the lung, it can be deadly. What increases your risk for blood clots? Some of the things that can increase your risk for a blood clot include:  Slowed blood flow  When blood doesn't flow normally, clots are more likely to develop. Reduced blood flow may result from long-term bed rest, such as after a surgery, injury, or serious illness. Or it may result from sitting for a long time, especially when traveling long distances. Abnormal clotting  Some people have blood that clots too easily or too quickly. Problems that may cause increased clotting include:  · Having certain blood problems that make blood clot too easily. This is a problem that may run in families. · Having certain health problems, such as cancer, heart failure, stroke, or severe infection. · Being pregnant. A woman's risk of getting blood clots increases both during pregnancy and shortly after delivery or after a  section. · Using hormonal forms of birth control or hormone therapy. · Smoking.   Injury to the blood vessel wall  Blood is more likely to clot in veins and arteries shortly after they are injured. Injury can be caused by a recent medical procedure or surgery that involved your legs, hips, belly, or brain. Or it can be caused by an injury, such as a broken hip. What can you do to prevent blood clots? After any procedure or event that increases your risk  · Take a blood-thinning medicine (called an anticoagulant) as directed if your doctor prescribes one. · Exercise  your lower leg muscles to help keep the blood moving through your legs. Point your toes up toward your head so the calves of your legs are stretched, then relax. Repeat. This is a good exercise to do when you are sitting for long periods of time. · Get up out of bed as soon as you safely can or as soon as your doctor says it's okay after an illness or surgery. If you can't get out of bed, you can do the leg exercise described above. Try to do this leg exercise every hour when you are awake. This will help keep the blood moving through your legs. If you are in the hospital and need to stay in bed, your doctor may have you use a special device that inflates and deflates knee-high boots to help keep blood from pooling in your legs. · Use compression stockings if your doctor prescribes them. These are specially fitted stockings that may prevent blood clots by keeping blood from pooling in your legs. When you travel  · Take breaks when you travel. On long car trips, stop the car and walk around every hour or so. On long bus or train rides or plane flights, get out of your seat and walk up and down the aisle every hour or so. · Do leg exercises while you are seated. For example, pump your feet up and down by pulling your toes up toward your knees and then pointing them down. If you already have a risk of blood clots, talk to your doctor before taking a long trip. Your doctor may want you to wear compression stockings or take blood-thinning medicine. Take care of your body  · Be active.  Try to get 30 minutes or more of activity on most days of the week. · Don't smoke. Smoking can increase your risk of blood clots. If you need help quitting, talk to your doctor about stop-smoking programs and medicines. · Check with your doctor about whether you should use hormonal forms of birth control or hormone therapy. These may increase your risk of blood clots. When should you call for help? Call 911 anytime you think you may need emergency care. For example, call if:  · You have symptoms of a blood clot in your lung (called a pulmonary embolism). These may include:  ? Sudden chest pain. ? Trouble breathing. ? Coughing up blood. Call your doctor now or seek immediate medical care if:  · You have symptoms of a blood clot in your arm or leg (called a deep vein thrombosis). These may include:  ? Pain in the arm, calf, back of the knee, thigh, or groin. ? Redness and swelling in the arm, leg, or groin. Where can you learn more? Go to http://www.gray.com/  Enter F229 in the search box to learn more about \"Learning About How to Prevent Blood Clots. \"  Current as of: March 4, 2020               Content Version: 12.6  © 6909-1171 [employer]. Care instructions adapted under license by "[employer] (which disclaims liability or warranty for this information). If you have questions about a medical condition or this instruction, always ask your healthcare professional. [place] [address] any warranty or liability for your use of this information. Patient Education        8 Things You Can Do to Help Prevent Blood Clots  A blood clot is a clump of blood that forms in a blood vessel, such as a vein or an artery. If a clot gets stuck, it can stop blood flow and cause serious health problems, even death. That's why it's important to take steps to prevent a clot. Take a blood-thinning medicine (called an anticoagulant), if prescribed.   If your doctor prescribes medicine, take it as directed. Exercise your lower leg muscles. This helps keep the blood moving through your legs. Point your toes up toward your head so the calves of your legs are stretched, then relax. Repeat. This is a good exercise to do when you are sitting for long periods of time. Get up out of bed as soon as your doctor says it's okay. Being active is one of the best things you can do to prevent clots. Take plenty of breaks when you travel. On long car trips, stop the car and walk around every hour or so. On the bus, plane, or train, get out of your seat and walk up and down the aisle every hour, if you can. Be active. Try to get 30 minutes or more of activity on most days of the week. Don't smoke. Smoking can increase your risk of blood clots. If you need help quitting, talk to your doctor about stop-smoking programs and medicines. Check with your doctor about whether you should use hormone forms of birth control or hormone therapy. These may increase your risk of blood clots. Use compression stockings if your doctor prescribes them. These are specially fitted stockings that may prevent blood clots by keeping blood from pooling in your legs. Current as of: May 27, 2020               Content Version: 12.6  © 2006-2020 [employer]. Care instructions adapted under license by [employer] (which disclaims liability or warranty for this information). If you have questions about a medical condition or this instruction, always ask your healthcare professional. [place] [address] any warranty or liability for your use of this information. Patient Education   Apixaban (By mouth)   Apixaban (a-PIX-a-ban)  Treats and prevents blood clots. This medicine is a blood thinner. Brand Name(s): Eliquis   There may be other brand names for this medicine. When This Medicine Should Not Be Used: This medicine is not right for everyone.  Do not use it if you had an allergic reaction to apixaban or you have active bleeding. How to Use This Medicine:   Tablet  · Your doctor will tell you how much medicine to use. Do not use more than directed. · If you are not able to swallow the tablets whole, they may be crushed and mixed in water, 5% dextrose in water (D5W), apple juice, or applesauce. The crushed tablets may be mixed with 60 mL of water or D5W dose and given through a nasogastric tube (NGT). · This medicine should come with a Medication Guide. Ask your pharmacist for a copy if you do not have one. · Missed dose: Take a dose as soon as you remember. If it is almost time for your next dose, wait until then and take a regular dose. Do not take extra medicine to make up for a missed dose. · Store the medicine in a closed container at room temperature, away from heat, moisture, and direct light. Drugs and Foods to Avoid:   Ask your doctor or pharmacist before using any other medicine, including over-the-counter medicines, vitamins, and herbal products. · Some medicines can affect how apixaban works. Tell your doctor if you are using any of the following:   ¨ Carbamazepine, clarithromycin, itraconazole, ketoconazole, phenytoin, rifampin, ritonavir, Margaret's wort  ¨ Blood thinner (including clopidogrel, heparin, prasugrel, warfarin)  ¨ Medicine to treat depression  ¨ NSAID pain or arthritis medicine (including aspirin, celecoxib, diclofenac, ibuprofen, naproxen)  Warnings While Using This Medicine:   · Tell your doctor if you are pregnant or breastfeeding, or if you have kidney disease, liver disease, bleeding problems, or an artificial heart valve. · Do not stop using this medicine suddenly without asking your doctor. You might have a higher risk of stroke for a short time after you stop using this medicine. · This medicine increases your risk for bleeding that can become serious if not controlled.  You may also bruise easily, and it may take longer than usual for bleeding to stop. · This medicine may increase your risk for blood clots in your spine or back if you undergo an epidural or spinal puncture. This could lead to paralysis. Tell your doctor if you ever had spine problems or back surgery. · Tell any doctor or dentist who treats you that you are using this medicine. With your doctor's supervision, you may need to stop using this medicine several days before you have surgery or medical tests. · Your doctor will do lab tests at regular visits to check on the effects of this medicine. Keep all appointments. · Keep all medicine out of the reach of children. Never share your medicine with anyone. Possible Side Effects While Using This Medicine:   Call your doctor right away if you notice any of these side effects:  · Allergic reaction: Itching or hives, swelling in your face or hands, swelling or tingling in your mouth or throat, chest tightness, trouble breathing  · Change in how much or how often you urinate, red or pink urine  · Chest pain, trouble breathing  · Coughing up blood, vomiting blood or material that looks like coffee grounds  · Numbness, tingling, or muscle weakness in your legs or feet  · Red or black, tarry stools  · Unusual bleeding, bruising, or weakness  If you notice other side effects that you think are caused by this medicine, tell your doctor. Call your doctor for medical advice about side effects. You may report side effects to FDA at 1-659-FDA-8386  © 2017 [facility] Information is for End User's use only and may not be sold, redistributed or otherwise used for commercial purposes. The above information is an  only. It is not intended as medical advice for individual conditions or treatments. Talk to your doctor, nurse or pharmacist before following any medical regimen to see if it is safe and effective for you.      Patient Education        Pulmonary Embolism: Care Instructions  Your Care Instructions     Pulmonary embolism is the sudden blockage of an artery in the lung. Blood clots in the deep veins of the leg or pelvis (deep vein thrombosis, or DVT) are the most common cause. These blood clots can travel to the lungs. Pulmonary embolism can be very serious. Because you have had one pulmonary embolism, you are at greater risk for having another one. But you can take steps to prevent another pulmonary embolism by following your doctor's instructions. You will probably take a prescription blood-thinning medicine to prevent blood clots. A blood thinner can stop a blood clot from growing larger and prevent new clots from forming. Follow-up care is a key part of your treatment and safety. Be sure to make and go to all appointments, and call your doctor if you are having problems. It's also a good idea to know your test results and keep a list of the medicines you take. How can you care for yourself at home? · Take your medicines exactly as prescribed. Call your doctor if you think you are having a problem with your medicine. You will get more details on the specific medicines your doctor prescribes. · If you are taking a blood thinner, be sure you get instructions about how to take your medicine safely. Blood thinners can cause serious bleeding problems. Preventing future pulmonary embolisms  · Exercise. Keep blood moving in your legs to keep clots from forming. If you are traveling by car, stop every hour or so. Get out and walk around for a few minutes. If you are traveling by bus, train, or plane, get out of your seat and walk up and down the aisles every hour or so. You also can do leg exercises while you are seated. Pump your feet up and down by pulling your toes up toward your knees then pointing them down. · Get up out of bed as soon as possible after an illness or surgery. · Do not smoke. If you need help quitting, talk to your doctor about stop-smoking programs and medicines. These can increase your chances of quitting for good. · Check with your doctor before taking hormone or birth control pills. These may increase your risk of blood clots. · Ask your doctor about wearing compression stockings to help prevent blood clots in your legs. There are different types of stockings, and they need to fit right. So your doctor will recommend what you need. When should you call for help? Call 911 anytime you think you may need emergency care. For example, call if:    · You have shortness of breath.     · You have chest pain.     · You passed out (lost consciousness).     · You cough up blood. Call your doctor now or seek immediate medical care if:    · You have new or worsening pain or swelling in your leg. Watch closely for changes in your health, and be sure to contact your doctor if:    · You do not get better as expected. Where can you learn more? Go to http://www.gray.com/  Enter E579 in the search box to learn more about \"Pulmonary Embolism: Care Instructions. \"  Current as of: March 4, 2020               Content Version: 12.6  © 4839-7939 [employer]. Care instructions adapted under license by [employer] (which disclaims liability or warranty for this information). If you have questions about a medical condition or this instruction, always ask your healthcare professional. [address] any warranty or liability for your use of this information.

## 2021-05-28 PROBLEM — E03.5 MYXEDEMA COMA (HCC): Status: RESOLVED | Noted: 2020-09-02 | Resolved: 2021-05-28

## 2021-08-08 NOTE — PROGRESS NOTES
Shift Assessment - Patient is alert and oriented x4. No complaint of pain at this time. Respirations are even and unlabored. IV heparin drip infusing. Resting in a low, locked bed with call light within reach. no

## 2024-11-05 NOTE — ED PROVIDER NOTES
Patient presents redrawn complaining of shortness of breath and chest pain. He states he's been feeling short of breath for about the past week that is worse with exertion. Today he had 2 episodes of very short squeezing sensation in the center of his chest while at rest.  He denies any fever or chills or cough. Review of systems otherwise negative      The history is provided by the patient. Chest Pain    This is a new problem. The current episode started 3 to 5 hours ago. The problem has been resolved. Episode Length: just a few seconds each. The pain is associated with rest. The pain is present in the substernal region. The pain is at a severity of 2/10. The pain is mild. Quality: squeezing. The pain does not radiate. Associated symptoms include dizziness and shortness of breath. Pertinent negatives include no abdominal pain, no back pain, no claudication, no cough, no diaphoresis, no exertional chest pressure, no fever, no headaches, no hemoptysis, no irregular heartbeat, no leg pain, no lower extremity edema, no malaise/fatigue, no nausea, no near-syncope, no numbness, no orthopnea, no palpitations, no PND, no sputum production, no vomiting and no weakness. He has tried nothing for the symptoms. The treatment provided no relief. Risk factors include family history, hypertension and male gender. Past Medical History:   Diagnosis Date    Arthritis     Change in vision     Chronic hip pain 10/20/2016    Edema leg     Endocrine disease     hypothyroidism    GERD (gastroesophageal reflux disease)     well controlled on meds    High cholesterol     Hypertension     Iron deficiency anemia     Murmur     Muscle weakness     Myalgia     Neurological disorder     HA    Pelvic pain in male     Polymyalgia rheumatica ([facility] [address] 10/20/2016    Psychiatric disorder     depression    SDH (subdural hematoma) (HCC)     HA, memory loss    Thyroid disease     hypo.         Past Surgical History:   Procedure Voice mail box is not set up could not leave a message.   Laterality Date    HX TONSILLECTOMY           Family History:   Problem Relation Age of Onset    Cancer Mother     Heart Disease Father     Hypertension Father        Social History     Socioeconomic History    Marital status: SINGLE     Spouse name: Not on file    Number of children: Not on file    Years of education: Not on file    Highest education level: Not on file   Social Needs    Financial resource strain: Not on file    Food insecurity - worry: Not on file    Food insecurity - inability: Not on file   [employer] needs - medical: Not on file   [employer] needs - non-medical: Not on file   Occupational History    Not on file   Tobacco Use    Smoking status: Former Smoker     Years: 20.00     Last attempt to quit: 2006     Years since quittin.8    Smokeless tobacco: Former User    Tobacco comment: Chew, quit 15 years ago. Substance and Sexual Activity    Alcohol use: Yes     Alcohol/week: 7.0 oz     Types: 14 Shots of liquor per week    Drug use: No    Sexual activity: No   Other Topics Concern    Not on file   Social History Narrative    Not on file         ALLERGIES: Pcn [penicillins] and Amoxicillin    Review of Systems   Constitutional: Negative for diaphoresis, fever and malaise/fatigue. Respiratory: Positive for shortness of breath. Negative for cough, hemoptysis and sputum production. Cardiovascular: Positive for chest pain. Negative for palpitations, orthopnea, claudication, PND and near-syncope. Gastrointestinal: Negative for abdominal pain, nausea and vomiting. Musculoskeletal: Negative for back pain. Neurological: Positive for dizziness. Negative for weakness, numbness and headaches. All other systems reviewed and are negative.       Vitals:    18 1237   BP: 161/88   Pulse: (!) 118   Resp: 22   Temp: 97.5 °F (36.4 °C)   SpO2: 98%   Weight: 89.4 kg (197 lb)   Height: 6' 5\" (1.956 m)            Physical Exam   Constitutional: He is oriented to person, place, and time. He appears well-developed and well-nourished. Non-toxic appearance. He does not appear ill. No distress. HENT:   Head: Normocephalic and atraumatic. Eyes: EOM are normal. Pupils are equal, round, and reactive to light. Neck: Normal range of motion. Neck supple. No JVD present. No thyromegaly present. Cardiovascular: Regular rhythm, normal heart sounds and intact distal pulses. Tachycardia   Pulmonary/Chest: Effort normal and breath sounds normal.   Abdominal: Soft. Bowel sounds are normal.   Musculoskeletal: Normal range of motion. He exhibits no edema or tenderness. Lymphadenopathy:     He has no cervical adenopathy. Neurological: He is alert and oriented to person, place, and time. Skin: Skin is warm and dry. Psychiatric: He has a normal mood and affect. His behavior is normal.   Nursing note and vitals reviewed.        MDM  Number of Diagnoses or Management Options  Diagnosis management comments: Differential diagnosis of this patient with a history of shortness of breath as well as well as tachycardia would include possibly excessive thyroid medication, complications of his polymyalgia rheumatica, pulmonary embolism, cardiac disease       Amount and/or Complexity of Data Reviewed  Clinical lab tests: ordered and reviewed  Tests in the radiology section of CPT®: ordered and reviewed  Independent visualization of images, tracings, or specimens: yes (EKG at 1237: Sinus tachycardia rate of 112 possible left atrial enlargement no acute ischemic changes are noted)    Risk of Complications, Morbidity, and/or Mortality  Presenting problems: high  Diagnostic procedures: moderate  Management options: moderate    Patient Progress  Patient progress: stable         Procedures